# Patient Record
Sex: FEMALE | Race: WHITE | Employment: STUDENT | URBAN - METROPOLITAN AREA
[De-identification: names, ages, dates, MRNs, and addresses within clinical notes are randomized per-mention and may not be internally consistent; named-entity substitution may affect disease eponyms.]

---
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
## 2022-09-02 ENCOUNTER — OFFICE VISIT (OUTPATIENT)
Dept: FAMILY MEDICINE CLINIC | Facility: CLINIC | Age: 22
End: 2022-09-02

## 2022-09-02 ENCOUNTER — PATIENT ROUNDING (BHMG ONLY) (OUTPATIENT)
Dept: FAMILY MEDICINE CLINIC | Facility: CLINIC | Age: 22
End: 2022-09-02

## 2022-09-02 VITALS
BODY MASS INDEX: 37.39 KG/M2 | HEIGHT: 63 IN | HEART RATE: 100 BPM | OXYGEN SATURATION: 97 % | SYSTOLIC BLOOD PRESSURE: 122 MMHG | DIASTOLIC BLOOD PRESSURE: 82 MMHG | WEIGHT: 211 LBS | TEMPERATURE: 97.3 F

## 2022-09-02 DIAGNOSIS — F41.8 DEPRESSION WITH ANXIETY: Primary | ICD-10-CM

## 2022-09-02 PROBLEM — Z97.5 IUD (INTRAUTERINE DEVICE) IN PLACE: Status: ACTIVE | Noted: 2022-09-02

## 2022-09-02 PROBLEM — F33.9 EPISODE OF RECURRENT MAJOR DEPRESSIVE DISORDER: Status: ACTIVE | Noted: 2022-09-02

## 2022-09-02 PROCEDURE — 99204 OFFICE O/P NEW MOD 45 MIN: CPT | Performed by: PHYSICIAN ASSISTANT

## 2022-09-02 RX ORDER — OMEPRAZOLE 20 MG/1
20 CAPSULE, DELAYED RELEASE ORAL DAILY
COMMUNITY
Start: 2021-09-08 | End: 2022-09-09

## 2022-09-02 RX ORDER — VENLAFAXINE HYDROCHLORIDE 150 MG/1
150 CAPSULE, EXTENDED RELEASE ORAL DAILY
Qty: 30 CAPSULE | Refills: 2 | Status: SHIPPED | OUTPATIENT
Start: 2022-09-02 | End: 2022-10-03

## 2022-09-02 RX ORDER — VENLAFAXINE HYDROCHLORIDE 75 MG/1
75 CAPSULE, EXTENDED RELEASE ORAL DAILY
Qty: 30 CAPSULE | Refills: 2 | Status: SHIPPED | OUTPATIENT
Start: 2022-09-02 | End: 2022-11-07

## 2022-09-02 RX ORDER — BUPROPION HYDROCHLORIDE 150 MG/1
150 TABLET ORAL EVERY MORNING
Qty: 30 TABLET | Refills: 2 | Status: SHIPPED | OUTPATIENT
Start: 2022-09-02 | End: 2023-03-21

## 2022-09-02 RX ORDER — TRAZODONE HYDROCHLORIDE 50 MG/1
50 TABLET ORAL NIGHTLY
COMMUNITY
Start: 2022-08-30

## 2022-09-02 RX ORDER — VENLAFAXINE HYDROCHLORIDE 150 MG/1
150 CAPSULE, EXTENDED RELEASE ORAL DAILY
COMMUNITY
Start: 2022-08-30 | End: 2022-09-02

## 2022-09-02 RX ORDER — LORATADINE 10 MG/1
10 TABLET ORAL DAILY
COMMUNITY

## 2022-09-02 RX ORDER — BUPROPION HYDROCHLORIDE 150 MG/1
150 TABLET ORAL EVERY MORNING
COMMUNITY
Start: 2022-04-06 | End: 2022-09-02 | Stop reason: SDUPTHER

## 2022-09-02 RX ORDER — ALBUTEROL SULFATE 90 UG/1
2 AEROSOL, METERED RESPIRATORY (INHALATION)
COMMUNITY
Start: 2022-04-06

## 2022-09-02 RX ORDER — HYDROXYZINE HYDROCHLORIDE 25 MG/1
25 TABLET, FILM COATED ORAL 3 TIMES DAILY PRN
Qty: 60 TABLET | Refills: 2 | Status: SHIPPED | OUTPATIENT
Start: 2022-09-02 | End: 2022-10-03
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
## 2022-09-06 ENCOUNTER — REFERRAL TRIAGE (OUTPATIENT)
Dept: CASE MANAGEMENT | Facility: OTHER | Age: 22
End: 2022-09-06

## 2022-09-13 PROCEDURE — U0004 COV-19 TEST NON-CDC HGH THRU: HCPCS | Performed by: FAMILY MEDICINE

## 2022-09-14 ENCOUNTER — TELEPHONE (OUTPATIENT)
Dept: URGENT CARE | Facility: CLINIC | Age: 22
End: 2022-09-14

## 2022-09-14 NOTE — TELEPHONE ENCOUNTER
----- Message from Leighton Iglesias MD sent at 9/14/2022  1:40 PM EDT -----  Please inform patient of negative COVID test    ----- Message -----  From: Lab, Background User  Sent: 9/14/2022   1:32 PM EDT  To:  [address] Covid Results

## 2022-09-20 ENCOUNTER — PATIENT OUTREACH (OUTPATIENT)
Dept: CASE MANAGEMENT | Facility: OTHER | Age: 22
End: 2022-09-20

## 2022-09-20 NOTE — OUTREACH NOTE
SW made four attempts but was unable to establish contact with pt. SW will close referral at this time.    [facility] -   Ambulatory Case Management    9/20/2022, 10:33 EDT

## 2022-10-03 ENCOUNTER — OFFICE VISIT (OUTPATIENT)
Dept: FAMILY MEDICINE CLINIC | Facility: CLINIC | Age: 22
End: 2022-10-03

## 2022-10-03 VITALS
DIASTOLIC BLOOD PRESSURE: 80 MMHG | BODY MASS INDEX: 37.92 KG/M2 | HEIGHT: 63 IN | OXYGEN SATURATION: 98 % | SYSTOLIC BLOOD PRESSURE: 122 MMHG | TEMPERATURE: 97.1 F | WEIGHT: 214 LBS | HEART RATE: 95 BPM

## 2022-10-03 DIAGNOSIS — F33.9 EPISODE OF RECURRENT MAJOR DEPRESSIVE DISORDER, UNSPECIFIED DEPRESSION EPISODE SEVERITY: Primary | ICD-10-CM

## 2022-10-03 DIAGNOSIS — Z23 NEED FOR INFLUENZA VACCINATION: ICD-10-CM

## 2022-10-03 DIAGNOSIS — F41.9 ANXIETY: ICD-10-CM

## 2022-10-03 PROCEDURE — 90471 IMMUNIZATION ADMIN: CPT | Performed by: PHYSICIAN ASSISTANT

## 2022-10-03 PROCEDURE — 99214 OFFICE O/P EST MOD 30 MIN: CPT | Performed by: PHYSICIAN ASSISTANT

## 2022-10-03 PROCEDURE — 90686 IIV4 VACC NO PRSV 0.5 ML IM: CPT | Performed by: PHYSICIAN ASSISTANT

## 2022-10-03 RX ORDER — DESVENLAFAXINE SUCCINATE 50 MG/1
50 TABLET, EXTENDED RELEASE ORAL DAILY
Qty: 30 TABLET | Refills: 1 | Status: SHIPPED | OUTPATIENT
Start: 2022-10-03 | End: 2023-03-21

## 2022-10-03 RX ORDER — PROPRANOLOL HYDROCHLORIDE 10 MG/1
10 TABLET ORAL 2 TIMES DAILY PRN
Qty: 30 TABLET | Refills: 1 | Status: SHIPPED | OUTPATIENT
Start: 2022-10-03 | End: 2023-03-21

## 2022-10-03 RX ORDER — VENLAFAXINE HYDROCHLORIDE 37.5 MG/1
37.5 CAPSULE, EXTENDED RELEASE ORAL DAILY
Qty: 14 CAPSULE | Refills: 0 | Status: SHIPPED | OUTPATIENT
Start: 2022-10-03 | End: 2022-11-07

## 2022-10-03 NOTE — PATIENT INSTRUCTIONS
Decrease venlafaxine to 75 mg daily and start Pristiq 1 tablet daily for 2 weeks, THEN reduce venlafaxine to 37.5 mg daily for 2 weeks, THEN stop venlafaxine and continue Pristiq  Call [facility] to set up an appointment: [phone]
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
## 2022-11-07 ENCOUNTER — OFFICE VISIT (OUTPATIENT)
Dept: FAMILY MEDICINE CLINIC | Facility: CLINIC | Age: 22
End: 2022-11-07

## 2022-11-07 VITALS
DIASTOLIC BLOOD PRESSURE: 72 MMHG | HEART RATE: 94 BPM | SYSTOLIC BLOOD PRESSURE: 110 MMHG | WEIGHT: 218 LBS | OXYGEN SATURATION: 96 % | BODY MASS INDEX: 38.62 KG/M2 | HEIGHT: 63 IN

## 2022-11-07 DIAGNOSIS — F33.9 EPISODE OF RECURRENT MAJOR DEPRESSIVE DISORDER, UNSPECIFIED DEPRESSION EPISODE SEVERITY: Primary | ICD-10-CM

## 2022-11-07 PROCEDURE — 99213 OFFICE O/P EST LOW 20 MIN: CPT | Performed by: PHYSICIAN ASSISTANT

## 2022-11-07 NOTE — PROGRESS NOTES
"Chief Complaint   Patient presents with   • Depression     4 wk f/u       HPI     Oliva White is a 22 y.o. female who is here for 1 month  follow-up of depression.     Since her last visit, the patient states she established care with a psychiatrist at [facility]. She was seen by \"Nilda\" 3 days ago and started on a new medication but she is not sure what this is. She just found out today that her insurance will not be accepted in [place] and will not cover this medication or her other medications for about 1 week. She tapered off of venlafaxine and started Pristiq 2 weeks ago. She states she has a follow-up appointment with psychiatry next month. She feels her depression has worsened. She has ongoing stress related to living with an unsupportive roommate and the inability to get out of her lease. She admits to suicidal ideation but states she is not \"suicidal enough to go to the hospital.\" She does not have a current plan.       Past Medical History:   Diagnosis Date   • Allergic 2000   • Anemia 3/24/2016   • Anxiety 8/1/2011   • Asthma 2/17/2002       Past Surgical History:   Procedure Laterality Date   • ADENOIDECTOMY  2003   • TONSILLECTOMY  12/23/2020       Family History   Problem Relation Age of Onset   • Anxiety disorder Mother    • Depression Mother    • Hyperlipidemia Mother    • Mental illness Mother    • Hyperlipidemia Maternal Grandfather    • Alcohol abuse Paternal Grandfather    • Cancer Paternal Grandfather    • COPD Paternal Grandfather    • Alcohol abuse Paternal Grandmother    • Early death Maternal Uncle    • Mental illness Maternal Aunt        Social History     Socioeconomic History   • Marital status: Single   Tobacco Use   • Smoking status: Never   • Smokeless tobacco: Never   Vaping Use   • Vaping Use: Never used   Substance and Sexual Activity   • Alcohol use: Yes   • Drug use: Never   • Sexual activity: Not Currently     Partners: Male     Birth " control/protection: I.U.D.       Allergies   Allergen Reactions   • Clindamycin Other (See Comments) and Swelling     Facial/eye swelling with use of topical gel  Facial/eye swelling with use of topical gel  Eye swelling  Facial/eye swelling with use of topical gel         ROS    Review of Systems   Psychiatric/Behavioral: Positive for suicidal ideas, depressed mood and stress.       Vitals:    11/07/22 1252   BP: 110/72   Pulse: 94   SpO2: 96%     Body mass index is 38.62 kg/m².      Current Outpatient Medications:   •  albuterol sulfate  (90 Base) MCG/ACT inhaler, Inhale 2 puffs., Disp: , Rfl:   •  buPROPion XL (WELLBUTRIN XL) 150 MG 24 hr tablet, Take 1 tablet by mouth Every Morning., Disp: 30 tablet, Rfl: 2  •  desvenlafaxine (Pristiq) 50 MG 24 hr tablet, Take 1 tablet by mouth Daily., Disp: 30 tablet, Rfl: 1  •  Levonorgestrel (MIRENA) 20 MCG/DAY intrauterine device IUD, 1 each by Intrauterine route., Disp: , Rfl:   •  loratadine (CLARITIN) 10 MG tablet, Take 10 mg by mouth Daily., Disp: , Rfl:   •  ondansetron ODT (ZOFRAN-ODT) 4 MG disintegrating tablet, Place 1 tablet on the tongue Every 8 (Eight) Hours As Needed for Nausea or Vomiting for up to 6 doses., Disp: 6 tablet, Rfl: 0  •  propranolol (INDERAL) 10 MG tablet, Take 1 tablet by mouth 2 (Two) Times a Day As Needed (anxiety)., Disp: 30 tablet, Rfl: 1  •  traZODone (DESYREL) 50 MG tablet, Take 50 mg by mouth Every Night., Disp: , Rfl:     PE    Physical Exam  Vitals reviewed.   Constitutional:       General: She is not in acute distress.  Pulmonary:      Effort: Pulmonary effort is normal. No respiratory distress.   Neurological:      Mental Status: She is alert.   Psychiatric:         Mood and Affect: Affect is flat.         Behavior: Behavior is uncooperative and withdrawn.         Thought Content: Thought content does not include homicidal or suicidal plan.      Comments: Very aloof. The patient is playing a game on her phone during the visit.           Results    Results for orders placed or performed during the hospital encounter of 09/13/22   COVID-19 PCR, [facility], NP SWAB IN [facility]SP[facility] MEDIA/ORAL SWISH 24-30 HR TAT - Swab, Nasopharynx    Specimen: Nasopharynx; Swab   Result Value Ref Range    SARS-CoV-2 AMOL Not Detected Not Detected   POC Rapid Strep A    Specimen: Swab   Result Value Ref Range    Rapid Strep A Screen Positive (A) Negative, VALID, INVALID, Not Performed    Internal Control Passed Passed    Lot Number [id number]     Expiration Date 11-        A/P    Problem List Items Addressed This Visit        Mental Health    Episode of recurrent major depressive disorder (HCC) - Primary  -Recent transition to Pristiq without improvement thus far.   -The patient has not contacted her psychiatrist to let them know she was unable to  her new prescription so I did encourage her to do this and she agreed.  She verbalizes understanding that she should go to the emergency room if she has any SI with a plan.  -If she is unable to obtain prescription refills through her insurance, we did discuss good Rx and that her Pristiq and Wellbutrin should be reasonably priced with a goodRx coupon through [email or web address] pharmacy.  I asked her to let me know if she needs prescriptions called into a different location.  -Continue management per psychiatry and follow-up here in 6 weeks or sooner if needed.       Plan of care was reviewed with patient at the conclusion of today's visit. Education was provided regarding diagnoses, management, and the importance of keeping follow-up appointments. The patient was counseled regarding the risks, benefits, and possible side-effects of treatment. Patient and/or family express understanding and agreement with the management plan.        LISA Muñoz

## 2023-03-20 ENCOUNTER — E-VISIT (OUTPATIENT)
Dept: FAMILY MEDICINE CLINIC | Facility: TELEHEALTH | Age: 23
End: 2023-03-20
Payer: COMMERCIAL

## 2023-03-20 PROCEDURE — BRIGHTMDVISIT: Performed by: NURSE PRACTITIONER

## 2023-03-20 NOTE — E-VISIT TREATED
Chief Complaint: Cold, flu, COVID, sinus, hay fever, or seasonal allergies   Patient introduction   Patient is 23-year-old female with congestion, nasal discharge, voice hoarseness, headache, and fatigue that started less than 48 hours ago. Regarding date of symptom onset, patient writes: 03/18/2023.   COVID-19 exposure, testing history, and vaccination status:    No known exposure to a person with a confirmed or suspected case of COVID-19.    No recent travel outside of their local community.    Has not been tested for COVID-19 since symptom onset.    Patient was prompted to take a self-test at the time of interview, but does not have a COVID-19 test kit.    Received 3 doses of the COVID-19 vaccine (Moderna, Moderna, Moderna).    Received their most recent dose of the vaccine more than 14 days ago.   Risk factors for severe disease from COVID-19 infection:    BMI >= 25.    Asthma.    A mood disorder.   Patient-submitted comments: The headache I have feels like it's between my eyes and moving down towards my nose. I am an  and have a performance this week so getting my voice back and feeling better is very important.   Patient did not request an excuse note.   General presentation   Symptoms came on gradually.   Fever:    No fever.   Sinus and nasal symptoms:    Nasal discharge.    Postnasal drip.    Congestion without associated pain or pressure.    No itchy nose or sneezing.    No nasal drainage.   Throat symptoms:    Voice is moderately hoarse. Patient believes hoarseness is due to voice strain.    No sore throat.   Head and body aches:    Headache described as mild (1 to 3 on a scale of 1 to 10).    Fatigue.    No sweats.    No chills.    No myalgia.   Cough:    No cough.   Wheezing and shortness of breath:    Has asthma diagnosis.    Using an albuterol inhaler for asthma.    Using albuterol every 6 hours or longer.    No COPD diagnosis.    No wheezing.    No shortness of breath.    Current cold  symptoms do not aggravate their asthma.   Chest pain:    No chest pain.   Ear symptoms:    None.   Dizziness:    No dizziness.   Allergies:    Patient has known seasonal allergies, known pet allergies, and known dust allergies.    Patient does not think symptoms are allergy-related.   Flu exposure:    No recent known exposure to a person with a confirmed flu diagnosis.    Received a flu vaccine in the last 3 to 6 months.   Not taking any over-the-counter medications for current symptoms.   Review of red flags/alarm symptoms:    No changes in alertness or awareness.    No symptoms suggesting respiratory distress.    No symptoms suggesting intracranial hemorrhage.    No decreased urination.   Pregnancy/menstrual status/breastfeeding:    Not pregnant.    Not breastfeeding.    Regarding last menstrual period, patient writes: I have an IUD so I don't get my periods.   Self-exam:    Height: 160 centimeters    Weight: 99.7 kilograms    No difficulty moving their chin toward their chest.    Swollen/painful neck lymph nodes.   Recent antibiotic use:    Has not taken antibiotics for similar symptoms within the past month.   Current medications   Currently taking traZODone 50 MG tablet, Levonorgestrel 20 MCG/DAY intrauterine device IUD, loratadine 10 MG tablet, albuterol sulfate  (90 Base) MCG/ACT inhaler, Cymbalta, and Buspirone.   Medication allergies    Lincosamides.   Medication contraindication review   Patient has history of depression. Therefore, the following medication(s) will not be prescribed:    Metoclopramide.   No history of metoclopramide-associated dystonic reaction and tardive dyskinesia.   No known history of amoxicillin-clavulanate-associated cholestatic jaundice or hepatic impairment.   No known history of azithromycin-associated cholestatic jaundice or hepatic impairment.   Past medical history   Immune conditions: No immunocompromising conditions. No history of cancer.   Social history   Never  smoked tobacco.   Assessment   Allergic rhinitis. Ruled out: Traumatic laryngitis.   This is the likely diagnosis based on patient's interview responses and symptoms, including:    Congestion.    Nasal discharge.    Mild headache.    Postnasal drip.   Plan   Medications:    prednisone 20 mg tablet RX 20mg 1 tab PO qam 5d for allergies. Amount is 5 tab.    fluticasone 50 mcg/actuation nasal spray,suspension OTC 50mcg 2 sprays each nostril nasal qd 30d for nasal symptoms due to allergies or sinusitis. Fluticasone needs to be used every day to be effective. It may take up to a week for the full effects of the medication to be seen. Brands to look for include Flonase. Amount is 16 g.   The patient's prescription will be sent to:   [id number]   [address] [place] [address]   Phone: [phone]     Fax: [phone]   Patient informed to purchase OTC medication.   Education:    Condition and causes    Prevention    Treatment and self-care    When to call provider   ----------   Electronically signed by NIEVES Ortega on 2023-03-20 at 15:15PM   ----------   Patient Interview Transcript:   Please carefully consider each question and answer as best you can. This helps your provider give you the best care. Which of these symptoms are bothering you? Select all that apply.    Stuffed-up nose or sinuses    Runny nose    Hoarse voice or loss of voice    Headache    Fatigue or tiredness   Not selected:    Cough    Shortness of breath    Fever    Itchy or watery eyes    Itchy nose or sneezing    Loss of smell or taste    Sore throat    Sweats    Chills    Muscle or body aches    Nausea or vomiting    Diarrhea    I don't have any of these symptoms   Since your current symptoms started, have you had a viral test for COVID-19? - This includes home self-tests as well as nose swab or saliva tests done at a doctor's office, lab, or testing site. - It does NOT include antibody tests, which use a  "blood sample to test for past infection. Select one.    No   Not selected:    Yes   Taking a home COVID test can help your provider give you the best care. If you have a COVID test kit at home, take the test now before continuing with this interview. Do you have a COVID test kit at home? Select one.    No, I don't have a test kit   Not selected:    Yes, and I'll take a test now    Yes, but I prefer not to take a test now   Have you gotten the COVID-19 vaccine? Select one.    Yes   Not selected:    No   How many total doses of the COVID-19 vaccine have you gotten? This includes boosters as well as additional doses for those who are immunocompromised. Select one.    3 doses   Not selected:    1 dose    2 doses    4 doses    5 doses   1st dose    Moderna   Not selected:    J&J/Benito    Novavax    Pfizer   2nd dose    Moderna   Not selected:    J&J/Benito    Novavax    Pfizer   3rd dose    Moderna   Not selected:    J&J/Benito    Novavax    Pfizer   When did you get your most recent dose of the COVID-19 vaccine?    More than 14 days ago   Not selected:    Less than 48 hours (2 days) ago    48 to 72 hours (3 days) ago    3 to 5 days ago    5 to 7 days ago    7 to 14 days ago   In the last 14 days, have you traveled outside of your local community? This includes travel by car, RV, bus, train, or plane. Travel increases your chances of getting and spreading COVID-19. Select one.    No   Not selected:    Yes   In the last 14 days, have you had close contact with someone who has coronavirus (COVID-19)? \"Close contact\" means any of these: - Living in the same household as someone with COVID-19. - Caring for someone with COVID-19. - Being within 6 feet of someone with COVID-19 for a total of at least 15 minutes over a 24-hour period. For example, three 5-minute exposures for a total of 15 minutes. - Being in direct contact with respiratory droplets from someone with COVID-19 (being coughed on, kissing, sharing utensils). " "Select one.    No, not that I know of   Not selected:    Yes, a confirmed case    Yes, a suspected case   When did your current symptoms start? Select one.    Less than 48 hours ago   Not selected:    3 to 5 days ago    6 to 9 days ago    10 to 14 days ago    2 to 3 weeks ago    3 to 4 weeks ago    More than a month ago   Do you know the exact date your symptoms started? If so, enter the date as MM/DD/YY. Select one.    Yes (specify): 03/18/2023   Not selected:    No   Did your symptoms come on suddenly or gradually? Select one.    Gradually   Not selected:    Suddenly    I'm not sure   You mentioned having a headache. On a scale of 1 to 10, how severe is your headache pain? Select one.    Mild (1 to 3)   Not selected:    Moderate (4 to 6)    Severe (7 to 9)    Unbearable (10)    The worst headache of my life (10+)   You mentioned having a stuffy nose or sinus congestion. Do you feel pain or pressure in your sinuses?    No   Not selected:    Yes   What color is your nasal drainage? Select one.    My nose is stuffed but not draining or running   Not selected:    Clear    White    Yellow    Green    I'm not sure   Is there any drainage (mucus) going down the back of your throat? This kind of drainage is also called \"postnasal drip.\" Select one.    Yes   Not selected:    No, not that I know of   Since your symptoms started, have you felt dizzy? Select one.    No   Not selected:    Yes, but I can continue with my regular daily activities    Yes, and it makes it hard to stand, walk, or do daily activities   Do you have chest pain? You might also feel it as discomfort, aching, tightness, or squeezing in the chest. Select one.    No   Not selected:    Yes   Have you urinated at least 3 times in the last 24 hours? Select one.    Yes   Not selected:    No   Changes in alertness or awareness may mean you need emergency care. Since your symptoms started, have you had any of these? Select all that apply.    None of the above   " Not selected:    Confusion    Slurred speech    Not knowing where you are or what day it is    Difficulty staying conscious    Fainting or passing out   Do your symptoms include a whistling sound, or wheezing, when you breathe? Select one.    No   Not selected:    Yes    I'm not sure   Early in this interview, you told us you were hoarse or you'd lost your voice. How would you describe the changes to your voice? Select one.    It's harder than usual to talk   Not selected:    It just sounds a little raspy    I can barely talk at all   Is it possible that you strained your voice? Singing, yelling, or talking more or louder than usual can cause voice strain. Select one.    Yes   Not selected:    No, not that I know of   Do you have any of these symptoms in your ear(s)? Select all that apply.    None of the above   Not selected:    Pain    Pressure    Fullness    Crackling or popping    Plugged or blocked sensation   Can you move your chin toward your chest?    Yes   Not selected:    No, my neck is too stiff   Are your glands/lymph nodes swollen, or does it hurt when you touch them?    Yes   Not selected:    No, not that I can tell   In the past week, has anyone around you (such as at school, work, or home) had a confirmed diagnosis of the flu? A confirmed diagnosis means that a nose swab was done to verify a flu infection. Select all that apply.    No, not that I know of   Not selected:    I live with someone who has the flu    I've been within touching distance of someone who has the flu    I've walked by, or sat about 3 feet away from, someone who has the flu    I've been in the same building as someone who has the flu   Have you ever been diagnosed with asthma? Select one.    Yes   Not selected:    No   Are your cold symptoms making your asthma worse? Select one.    No   Not selected:    Yes   What medications or inhalers are you currently using for your asthma? Select all that apply.    Albuterol inhaler, as  needed (such as ProAir, Proventil, Ventolin)   Not selected:    Inhaled steroid (such as Qvar, Pulmicort, Flovent)    Inhaled steroid with long-acting bronchodilator (such as Advair, Dulera, Symbicort)    I'm not sure    I'm not taking any medications for my asthma    I usually take medications for my asthma, but I ran out   How often are you using albuterol? If you're using albuterol very frequently, you'll need to be seen in person. Select one.    Every 6 hours or longer   Not selected:    More than once an hour    Every 1 to 2 hours    Every 2 to 3 hours    Every 3 to 4 hours    Every 4 to 6 hours   Do you need a refill of albuterol? Select one.    No   Not selected:    Yes   Have you ever been diagnosed with chronic obstructive pulmonary disease (COPD)? Select one.    No, not that I know of   Not selected:    Yes   In the past month, have you taken antibiotics for similar symptoms? Examples of antibiotics include amoxicillin, amoxicillin-clavulanate (Augmentin), penicillin, cefdinir (Omnicef), doxycycline, and clindamycin (Cleocin). Select one.    No   Not selected:    Yes (specify)   Do you have allergies (pollen, dust mites, mold, animal dander)? Select one.    Yes   Not selected:    No, not that I know of   What kind of allergies do you have? Select all that apply.    Seasonal allergies (hay fever)    Pet allergies    Dust allergies   Not selected:    None of the above    I'm not sure   Do you think your symptoms could be allergy-related? Select one.    No   Not selected:    Yes    I'm not sure   Have you had a flu shot this season? Select one.    Yes, 3 to 6 months ago   Not selected:    Yes, less than 2 weeks ago    Yes, 2 to 4 weeks ago    Yes, 1 to 3 months ago    Yes, more than 6 months ago    No   Are you pregnant? Select one.    No   Not selected:    Yes   When was your last menstrual period? If you don't currently have periods or no longer have periods, please briefly explain.    I have an IUD so I  don't get my periods   Within the last 2 weeks, have you: - Given birth - Had a miscarriage - Had a pregnancy loss - Had an  Being postpartum (live birth or loss) within the last 2 weeks increases your risk of flu complications. Select one.    No   Not selected:    Yes   Are you breastfeeding? Select one.    No   Not selected:    Yes   The flu and COVID-19 can be more serious for people in certain groups. The next few questions help us figure out if you or anyone you live with is at higher risk for complications from these infections. Do any of these statements apply to you? Select all that apply.    None of the above   Not selected:    I'm     I'm     I'm Black    I'm  or    Do you smoke tobacco? Select one.    No   Not selected:    Yes, every day    Yes, some days    No, I quit   Do you have any of these conditions? Select all that apply.    Mood disorder, including depression or schizophrenia spectrum disorders   Not selected:    Chronic lung disease, such as cystic fibrosis or interstitial fibrosis    Heart disease, such as congenital heart disease, congestive heart failure, or coronary artery disease    Disorder of the brain, spinal cord, or nerves and muscles, such as dementia, cerebral palsy, epilepsy, muscular dystrophy, or developmental delay    Metabolic disorder or mitochondrial disease    Cerebrovascular disease, such as stroke or another condition affecting the blood vessels or blood supply to the brain    Down syndrome    Substance use disorder, such as alcohol, opioid, or cocaine use disorder    Tuberculosis    None of the above   Do you live in a group care setting? Examples include: - Nursing home - Residential care - Psychiatric treatment facility - Group home - [facility] - Board and care home - Homeless shelter - Foster care setting Select one.    No   Not selected:    Yes   Are you a healthcare worker? Select one.    No   Not selected:    Yes    People with a very high body mass index (BMI) are at higher risk for developing complications from the flu and severe illness from COVID-19. To determine your BMI, we need to know your weight and height. Please enter your weight (in pounds).    Weight   Please enter your height.    Height   Do you have any of these conditions that can affect the immune system? Scroll to see all options. Select all that apply.    None of these   Not selected:    History of bone marrow transplant    Chronic kidney disease    Chronic liver disease (including cirrhosis)    HIV/AIDS    Inflammatory bowel disease (Crohn's disease or ulcerative colitis)    Lupus    Moderate to severe plaque psoriasis    Multiple sclerosis    Rheumatoid arthritis    Sickle cell anemia    Alpha or beta thalassemia    History of solid organ transplant (kidney, liver, or heart)    History of spleen removal    An autoimmune disorder not listed here    A condition requiring treatment with long-term use of oral steroids (such as prednisone, prednisolone, or dexamethasone)   Have you ever been diagnosed with cancer? Select one.    No   Not selected:    Yes, I have cancer now    Yes, but I'm in remission   Do any of these apply to you? Select all that apply.    None of the above   Not selected:    I've been hospitalized within the last 5 days    I have diabetes    I'm in close contact with a child in    The flu and COVID-19 can be more serious for people in certain groups. Do any of these apply to the people who live with you? Select all that apply.    None of the above   Not selected:    Under age 5    Over age 65            Black     or     Pregnant    Has given birth, had a miscarriage, had a pregnancy loss, or had an  in the last 2 weeks   Does any member of your household have any of these medical conditions? Select all that apply.    None of the above   Not selected:    Asthma    Disorders of the brain,  spinal cord, or nerves and muscles, such as dementia, cerebral palsy, epilepsy, muscular dystrophy, or developmental delay    Chronic lung disease, such as COPD or cystic fibrosis    Heart disease, such as congenital heart disease, congestive heart failure, or coronary artery disease    Cerebrovascular disease, such as stroke or another condition affecting the blood vessels or blood supply to the brain    Blood disorders, such as sickle cell disease    Diabetes    Metabolic disorders such as inherited metabolic disorders or mitochondrial disease    Kidney disorders    Liver disorders    Weakened immune system due to illness or medications such as chemotherapy or steroids    Children under the age of 19 who are on long-term aspirin therapy    Extreme obesity (BMI > 40)   Do you have any of these conditions? Scroll to see all options. Select all that apply.    Depression   Not selected:    Aspirin triad (also known as Samter's triad or ASA triad)    Asthma or hives from taking aspirin or other NSAIDs, such as ibuprofen or naproxen    Blockage or narrowing of the blood vessels of the heart    Blood clotting disorder    Blood dyscrasia, such anemia, leukemia, lymphoma, or myeloma    Bone marrow depression    Catecholamine-releasing paraganglioma    Congenital long QT syndrome    Difficulty urinating or completely emptying your bladder    Uncorrected electrolyte abnormalities    Fungal infection    Gastrointestinal (GI) bleeding    Gastrointestinal (GI) obstruction    G6PD deficiency    Recent heart attack    High blood pressure    Irregular heartbeat or heart rhythm    Mononucleosis (mono)    Myasthenia gravis    Parkinson's disease    Pheochromocytoma    Reye syndrome    Seizure disorder    Thyroid disease    Ulcerative colitis    None of the above   Have you ever had either of these conditions? Select all that apply.    No   Not selected:    Metoclopramide-associated dystonic reaction    Tardive dyskinesia   Just a  "few more questions about medications, and then you're finished. Have you used any non-prescription medications or nasal sprays for your current symptoms? Examples include saline sprays, decongestants, NyQuil, and Tylenol. Select one.    No   Not selected:    Yes   Have you taken any monoamine oxidase inhibitor (MAOI) medications in the last 14 days? Examples include rasagiline (Azilect), selegiline (Eldepryl, Zelapar), isocarboxazid (Marplan), phenelzine (Nardil), and tranylcypromine (Parnate). Select one.    No, not that I know of   Not selected:    Yes   Do you take Kynmobi or Apokyn (apomorphine)? Select one.    No   Not selected:    Yes   Are you still taking these medications listed in your medical record? If you're not taking any of these, click Next. Select all that apply.    traZODone 50 MG tablet    Levonorgestrel 20 MCG/DAY intrauterine device IUD    loratadine 10 MG tablet    albuterol sulfate  (90 Base) MCG/ACT inhaler   Are you taking any other medications, vitamins, or supplements? Select one.    Yes   Not selected:    No   Have you ever had an allergic or bad reaction to any medication? Select one.    Yes   Not selected:    No   Have you had an allergic or bad reaction to any of these medications? Select all that apply.    No, not that I know of   Not selected:    Baloxavir (Xofluza)    Benzonatate (Tessalon Perles)    Fluconazole, itraconazole, or terconazole (brands include Diflucan, Sporanox, Terazol)    Oseltamivir (Tamiflu) or zanamivir (Relenza)    Paxlovid, nirmatrelvir, or ritonavir (Norvir)   Have you had an allergic or bad reaction to any of these antibiotic medications? Select all that apply.    Clindamycin or lincomycin (Brands include Cleocin and Lincocin)   Not selected:    Penicillin or any \"-cillin\" antibiotic, such as amoxicillin, ampicillin, dicloxacillin, nafcillin, or piperacillin (Brands include Augmentin, Unasyn, and Zosyn)    Tetracycline or any \"-cycline\" antibiotic, " "such as doxycycline, demeclocycline, minocycline (Brands include Declomycin, Doryx, Dynacin, Oracea, Monodox, Panmycin, and Vibramycin)    Ciprofloxacin or any \"-floxacin\" antibiotic, such as gemifloxacin, levofloxacin, moxifloxacin, or ofloxacin (Brands include Factive, Cipro, Floxin, and Levaquin)    Cephalexin or any \"cef-\" antibiotic, such as cefazolin, cefdinir, cefuroxime, ceftriaxone, ceftazidime, or cefepime (Brands include Ancef, Ceftin, Fortaz, Keflex, Maxipime, Rocephin, and Simplicef)    Azithromycin or any \"-thromycin\" antibiotic, such as erythromycin or clarithromycin (Brands include Biaxin, Erythrocin, Z-alexsandra, and Zithromax)    No, not that I know of   Have you had an allergic or bad reaction to any of these medications? Select all that apply.    No, not that I know of   Not selected:    Albuterol or a similar medication    Atropine    Corticosteroid (steroid) medication, including topical steroids, inhaled steroids, nasal steroids, or oral steroids (budesonide, ciclesonide, dexamethasone, flunisolide, fluticasone, methylprednisolone, triamcinolone, prednisone (or brand names Alvesco, Deltasone, Flovent, Medrol, Nasacort, Rhinocort, or Veramyst)    Metoclopramide (Reglan)    Ondansetron (Zuplenz, Zofran ODT, Zofran)    Prochlorperazine (Compazine)   Have you had an allergic or bad reaction to any of these eye drops, nasal sprays, or inhalers? Scroll to see all options. Select all that apply.    No, not that I know of   Not selected:    Azelastine (Astelin, Astepro, Optivar)    Cromolyn (Crolom, NasalCrom)    Ipratropium (Atrovent)    Ketotifen (Alaway, Zaditor)    Pheniramine/naphazoline (Naphcon-A, Opcon-A, Visine-A)    Olopatadine (Pataday, Patanol, Pazeo)   Have you had an allergic or bad reaction to any of these non-prescription medications? Scroll to see all options. Select all that apply.    No, not that I know of   Not selected:    Acetaminophen (Tylenol)    Aspirin    Cetirizine (Zyrtec)   "  Dextromethorphan (Delsym, Robitussin, Vicks DayQuil Cough)    Diphenhydramine (Benadryl)    Fexofenadine (Allegra)    Guaifenesin (Mucinex)    Dextromethorphan (Delsym)    Ibuprofen (Advil, Motrin, Midol)    Loratadine (Alavert, Claritin)    Oxymetazoline (Afrin)    Phenylephrine (Sudafed PE)    Pseudoephedrine (Sudafed)   Are you allergic to milk or to the proteins found in milk (for example, whey or casein)? A milk allergy is different from lactose intolerance. Select one.    No, not that I know of   Not selected:    Yes   Have you ever had jaundice or liver problems as a result of taking amoxicillin-clavulanate (Augmentin)? Jaundice is a condition in which the skin and the whites of the eyes turn yellow. Select all that apply.    No, not that I know of   Not selected:    Yes, jaundice    Yes, liver problems   Have you ever had jaundice or liver problems as a result of taking azithromycin (Zithromax, Zmax)? Jaundice is a condition in which the skin and the whites of the eyes turn yellow. Select all that apply.    No, not that I know of   Not selected:    Yes, jaundice    Yes, liver problems   Do you need a doctor's note? A doctor's note confirms that you received care today and states when you can return to school or work. It does not contain information about your diagnosis or treatment plan. Your provider will make the final decision on whether to give you a doctor's note and for how long. Doctor's notes CANNOT be backdated. We can't provide medical leave paperwork through this type of visit. If more paperwork is needed to request time off, contact your primary care provider. Select one.    No   Not selected:    Today only (1 day)    Today and tomorrow (2 days)    3 days    5 days    7 days    10 days    14 days   Is there anything else you'd like to tell us about your symptoms?    The headache I have feels like it's between my eyes and moving down towards my nose. I am an  and have a performance  this week so getting my voice back and feeling better is very important   ----------   Medical history   The following information was received from the EMR on March 20, 2023.   Allergies:    CLINDAMYCIN   - Allergy Type: Medication   - Reaction: Other (See Comments), Swelling   - Severity: None   - Clinical Status: Active   - Verification Status: Confirmed   Medications:    traZODone (DESYREL) tablet   - Route: Oral   - Start Date: September 02, 2022   - End Date: None   - Status: Active    desvenlafaxine (PRISTIQ) 24 hr tablet   - Route: Oral   - Start Date: October 03, 2022   - End Date: None   - Status: Active    LEVONORGESTREL 20 MCG/DAY IU IUD   - Route: Intrauterine   - Start Date: September 02, 2022   - End Date: None   - Status: Active    buPROPion (WELLBUTRIN XL) 24 hr tablet   - Route: Oral   - Start Date: September 02, 2022   - End Date: None   - Status: Active    ondansetron (ZOFRAN-ODT) disintegrating tablet   - Route: Translingual   - Start Date: September 13, 2022   - End Date: None   - Status: Active    loratadine (CLARITIN) tablet 10 mg   - Route: Oral   - Start Date: September 02, 2022   - End Date: None   - Status: Active    propranolol (INDERAL) tablet   - Route: Oral   - Start Date: October 03, 2022   - End Date: None   - Status: Active    albuterol (PROVENTIL HFA;VENTOLIN HFA;PROAIR HFA) inhaler   - Route: Inhalation   - Start Date: September 02, 2022   - End Date: None   - Status: Active   Problem list:    IUD (intrauterine device) in place   - Category: Problem List Item   - Health Status:   - Start Date: September 02, 2022   - End Date: None   - Status: Active    Episode of recurrent major depressive disorder (HCC)   - Category: Problem List Item   - Health Status:   - Start Date: September 02, 2022   - End Date: None   - Status: Active    Anxiety   - Category: Problem List Item   - Health Status:   - Start Date: September 02, 2022   - End Date: None   - Status: Active    Asthma    - Category: Problem List Item   - Health Status:   - Start Date: September 02, 2022   - End Date: None   - Status: Active

## 2023-03-20 NOTE — EXTERNAL PATIENT INSTRUCTIONS
Diagnosis   Allergic rhinitis (allergies)   My name is Rosy Corrales, and I'm a healthcare provider at [facility]. I reviewed your interview and I see that you have allergic rhinitis, also known as seasonal allergies or hay fever. Allergic rhinitis is not an infection. It isn't caused by a virus or bacteria. Although allergy symptoms can be unpleasant, your symptoms aren't part of a more serious condition.   In your interview, you mentioned that you don't think your symptoms are allergy-related. However, based on your responses, I believe this is the right diagnosis for you. If you still aren't feeling better after following this treatment plan, contact us to set up an appointment.   Based on what you told me in your interview, I haven't prescribed any antibiotics. Antibiotics won't help with allergies. Your symptoms suggest you have allergies, not an infection:    You haven't had symptoms long enough to suggest a bacterial infection.    You don't have a fever. Viral and bacterial infections, but not allergies, can lead to a fever.    You don't have sinus pain. Sinus infections can cause sinus pain or pressure.    You don't have a sore throat. Bacterial infections like strep throat cause a severe sore throat.   Medications   Your pharmacy   [facility] [id number] [address] [place] [id number] [phone]     Prescription   Prednisone (20mg): Take 1 tablet by mouth every morning for 5 days for allergies.   Non-prescription   Fluticasone (50mcg): Spray 2 times in each nostril daily for nasal symptoms due to allergies or sinusitis. Fluticasone needs to be used every day to be effective. It may take up to a week for the full effects of the medication to be seen. Brands to look for include Flonase.   About your diagnosis   Allergic rhinitis, also called allergies or hay fever, is very common. Symptoms can seem similar to a cold, but they're caused by an allergen, not a virus. These are the key  things to know about allergies:    The best way to treat allergies is to avoid the cause of your symptoms. Medications can also help your symptoms.    Symptoms range in severity. They may be only mildly annoying or they may seriously affect day-to-day life.    Symptoms can be seasonal, triggered by tree pollen, grasses, and weeds. Mold, dust, or pet dander can cause allergy symptoms in any season.   In addition to symptoms affecting the nose and eyes, allergies can cause fatigue and irritability.   What to expect   Despite the unpleasant symptoms, you should feel better soon. Follow the treatment plan provided here.   When to seek care   Call us at [phone]   with any sudden or unexpected symptoms.    Severe allergy symptoms.    Your normal allergy medications stop working or cause uncomfortable side effects.    Sinus pain that lasts for more than 10 days, without improvement.    Severe chest pain.   Other treatment   If possible, avoid the allergens that trigger your allergy symptoms. If you normally use medications, inhalers, or an asthma action plan, continue using them as prescribed.   Prevention   Avoid your known allergens, if possible. Allergy medications work best if you take them before your symptoms develop.   Avoid smoke and air pollution. Smoke can make infections worse.    Flu vaccine information   Who should get a flu vaccine?   Everyone 6 months of age and older should get a yearly flu vaccine.   When should I get vaccinated?   You should get a flu vaccine by the end of October. Once you're vaccinated, it takes about two weeks for antibodies to develop and protect you against the flu. That's why it's important to get vaccinated as soon as possible.   After October, is it too late to get vaccinated?   No. You should still get vaccinated. As long as the flu viruses are still in your community, flu vaccines will remain available, even into January of next year or later.   Why do I need a flu  vaccine EVERY year?   Flu viruses are constantly changing, so flu vaccines are usually updated from one season to the next. Your protection from the flu vaccine also lessens over time.   Is the flu vaccine safe?   Yes. Over the last 50 years, hundreds of millions of Americans have safely received the flu vaccines.   What are the side effects of flu vaccines?   You CANNOT get the flu from a flu vaccine. Common side effects of the flu shot include soreness, redness and/or swelling where the shot was given, low grade fever, and aches. Common side effects of the nasal spray flu vaccine for adults include runny nose, headaches, sore throat, and cough. For children, side effects include wheezing, vomiting, muscle aches, and fever.   Does the flu vaccine increase your risk of getting COVID-19?   No. There is no evidence that getting a flu vaccine increases your risk of getting COVID-19.   Is it safe to get the flu vaccine along with a COVID-19 vaccine?   Yes. It's safe to get the flu vaccine with a COVID-19 vaccine or booster.   Contact your healthcare provider TODAY for details on when and where to get your flu vaccine.    Coronavirus (COVID-19) information   Common symptoms of COVID-19 include fever, cough, shortness of breath, fatigue, muscle or body aches, headaches, new loss of sense of taste or smell, sore throat, stuffy or runny nose, nausea or vomiting, and diarrhea. Most people who get COVID-19 have mild symptoms and can rest at home until they get better. Elderly people and those with chronic medical problems may be at risk for more serious complications.   FAQs about the COVID-19 vaccine   There are four authorized COVID-19 vaccines: [employer] Benito Vaccine (J&J/Benito), Moderna, Novavax, and Pf[employer] (Pfizer). The J&J/Benito and Novavax vaccines are approved for use in people aged 18 and older. The Moderna and Pfizer vaccines are approved for those aged 6 months and older. All four are  available at no cost. Even if you don't have health insurance, you can still get the COVID-19 vaccine for free.   Which vaccine is the best? Which vaccine should I get?   All four vaccines are highly effective. Even if you get COVID-19 after being vaccinated, all of the vaccines help prevent severe disease, hospitalization, and complications.   Most people should get whichever vaccine is first available to them. However, women younger than 50 years old should consider the rare risk of blood clots with low platelets after vaccination with the J&J/[employer] vaccine. This risk hasn't been seen with the other three vaccines.   Are the vaccines safe?   Yes. Hundreds of millions of people in the [place] have already safely received COVID-19 vaccines under the most intense safety monitoring in the history of the [place].   Do I need the vaccine if I've already had COVID?   Yes. Vaccination helps protect you even if you've already had COVID.   If you had COVID-19 and had symptoms, wait to get vaccinated until you've recovered and completed your isolation period.   If you tested positive for COVID-19 but did not have symptoms, you can get vaccinated after 5 full days have passed since you had a positive test, as long as you don't develop symptoms.   How many doses of the vaccine do I need?   Visit www.cdc.gov/coronavirus/2019-ncov/vaccines/stay-up-to-date.html   to find out how to stay up to date with your COVID-19 vaccines.   I'm immunocompromised. How many doses of the vaccine do I need?   For information on how immunocompromised people can stay up to date with their COVID-19 vaccines, visit www.cdc.gov/coronavirus/2019-ncov/vaccines/recommendations/immuno.html  .   What are the common side effects of the vaccine?   A sore arm, tiredness, headache, and muscle pain may occur within two days of getting the vaccine and last a day or two. For the Moderna or Pfizer vaccines, side effects are more common after the second dose. People over  the age of 55 are less likely to have side effects than younger people.   After I'm up to date on vaccines, can I still get or spread COVID?   Yes, you can still get COVID, but your disease should be milder. And your risk of serious illness, hospitalization, and complications will be much lower, especially if you're up to date. Unfortunately, you can still spread COVID if you've been vaccinated. That's why it's important to follow isolation guidelines if you get sick or test positive.   After I'm up to date on vaccines, can I go back to normal?   You should still wear a mask indoors in public if:    It's required by laws, rules, regulations, or local guidance.    You have a weakened immune system.    Your age puts you at increased risk of severe disease.    You have a medical condition that puts you at increased risk of severe disease.    Someone in your household has a weakened immune system, is at increased risk for severe disease, or is unvaccinated.    You're in an area of high transmission.   Where can I get a COVID-19 vaccine?   Visit [facility]'s website for more information. To find a COVID-19 vaccination site near you, visit www.vaccines.gov/  , call [phone]  , or text your zip code to 229458 (CONSTRVCT). Message and data rates may apply.   What about travel?   Travel increases your risk of exposure to COVID-19. For more information, see www.cdc.gov/coronavirus/2019-ncov/travelers/index.html  .   I've had close contact with someone who has COVID. Do I need to quarantine, and if so, for how long?   For the most current answer, including a calculator to determine whether you need to stay home and for how long, visit www.cdc.gov/coronavirus/2019-ncov/your-health/quarantine-isolation.html  .   I've tested positive for COVID. How long do I need to isolate?   For the latest recommendations, including a calculator to determine how long you need to stay home, visit  www.cdc.gov/coronavirus/2019-ncov/your-health/quarantine-isolation.html  .   What if I develop symptoms that might be from COVID?   For the latest recommendations on what to do if you're sick, including when to seek emergency care, visit www.cdc.gov/coronavirus/2019-ncov/if-you-are-sick/steps-when-sick.html  .   Your provider   Your diagnosis was provided by NIEVES Ortega, a member of your trusted care team at [facility].   If you have any questions, call us at [phone]  .

## 2023-06-04 ENCOUNTER — E-VISIT (OUTPATIENT)
Dept: ADMINISTRATIVE | Facility: OTHER | Age: 23
End: 2023-06-04

## 2023-06-04 ENCOUNTER — E-VISIT (OUTPATIENT)
Dept: FAMILY MEDICINE CLINIC | Facility: TELEHEALTH | Age: 23
End: 2023-06-04

## 2023-06-04 NOTE — E-VISIT ESCALATED
Patient escalated   Provider NIEVES Sheriff chose to escalate patient to another level of care because: Desired treatments not available   Additional Details: Ms. Cindy, I recommend an in person visit with your PCP or [facility] if needing evaluation today. You need a good abdominal evaluation and your provider may want to order additional testing. Thank you, Radha PICKETT   Patient was sent the following message:   Based on the information you've provided us, you need to take another step to get care.   What to do now:   Urgent Care   You should be seen within the next 24 hours.   Please go to a walk-in clinic or urgent care, or make an appointment to be seen within the next 24 hours.   You won't be charged for your eVisit. If you paid with a credit card, the charge will be reversed.   Chief Complaint: Constipation   Patient introduction   Patient is 23-year-old female who has fewer than 3 stools per week, hard or lumpy stools, small stools, straining with bowel movements, a feeling of blockage in their rectum that prevents bowel movements, difficulty completely emptying their bowels, bloating, and abdominal distention. Their most bothersome symptom is having fewer than 3 stools per week. Symptoms have been present for more than 3 months.   Warning. The following may warrant further investigation:    Straining required for both hard and soft stools   General presentation   Sometimes needs help to have a bowel movement, such as using a finger to remove stool, but not with every bowel movement.   Abdomen does not feel white, tighter, or firmer as a result of bloating/distention.   Symptoms are persistent. Patient last passed stool yesterday. On average, patient has a bowel movement once a week. Patient feels the need or urge to have a bowel movement less than 3 times per week. When patient feels the need for a bowel movement, they always try to have one immediately. When patient tries to have a bowel movement,  they are always able to pass stool and they feel as if they have emptied their bowels less than half the time.   Current episode of constipation feels similar to past episodes.   Patient has used the following OTC or alternative treatment(s) for their symptoms: polyethylene glycol, magnesium citrate, bisacodyl, senna, docusate sodium, herbs or supplements, and homeopathic remedies. Has used bisacodyl more than 1 time, but not regularly. Takes an unknown dose of an unknown strength of senna more than 1 time, but not regularly.   Review of red flags/alarm symptoms:    No inflammatory bowel disease    No intestinal obstruction    No diverticulitis    No irritable bowel syndrome    No Hirschsprung's disease    No Chagas disease    No history of colorectal cancer    No unexplained or unintentional weight loss over the last 6 to 12 months    No family history of colorectal cancer in a first-degree relative    No family history of inflammatory bowel disease (IBD)    No systemic symptoms such as fever, nausea, vomiting, sweats or chills, or body aches    No rectal prolapse    No fecal incontinence   History of diagnosis/treatment for depression.   No history of iron-deficiency anemia.   Patient cites low water intake as lifestyle risk factors for constipation. No recent immobility or inactivity.   Regularly takes antidepressants. Did not use street/recreational opioids prior to symptom onset.   Psychosocial factors:   No significant life change, significant life stress, or increased stress in general.   Anxiety pre-screening with JAGUAR-2:    (0) Has not felt nervous, anxious, or on edge over the last 2 weeks.    (0) No inability to stop or control worrying over the last 2 weeks.    JAGUAR-2 score: 0. Negative result on JAGUAR-2 pre-screen, using a cutoff of 3.   Pregnancy/menstrual status/breastfeeding:   Not pregnant. Not breastfeeding. Regarding date of last menstrual period, patient writes: I have an IUD so I don't have periods.  .   Current medications   Currently taking DULoxetine 60 MG capsule, traZODone 50 MG tablet, Levonorgestrel 20 MCG/DAY intrauterine device IUD, busPIRone 10 MG tablet, DULoxetine 30 MG capsule, and buPROPion  MG 24 hr tablet.   Medication allergies   No relevant drug allergies.   Patient-submitted comments   Patient was asked if they had anything to add about their symptoms. Patient writes: I've had constipation my entire life. It has gotten worse the past year. In May it took15 doses of Miralax & Ex-lax over 48hrs for me to go. When I am able to go, it's extremely hard & causes bleeding (even hemorrhoids) or it's all at once & hurts. .   Patient did not request an excuse note.   Assessment:   Patient determined to need a level of care not appropriate to be delivered through eVisit.   Plan:   Patient informed of need to seek in-person care   ----------   Electronically signed by NIEVES Sheriff on 2023-06-04 at 10:40AM   ----------   Patient Interview Transcript:   Which symptoms are you having? Select all that apply.    Fewer than 3 stools per week    Hard or lumpy stools    Stools that are too small    Straining with bowel movements    Feeling as though a blockage in my rectum is preventing a bowel movement    Feeling as though I can't completely empty my bowels    Bloating    Swollen or distended abdomen   Not selected:    Abdominal pain or discomfort   Which symptom is bothering you the most? Select one.    Fewer than 3 stools per week   Not selected:    Abdominal pain or discomfort    Bloating    Swollen or distended abdomen    Hard or lumpy stools    Stools that are too small    Straining with bowel movements    Feeling as though a blockage in my rectum is preventing a bowel movement    Feeling as though I can't completely empty my bowels   How long have you had your symptoms? Select one.    More than 3 months   Not selected:    Less than 1 week    1 to 2 weeks    2 to 4 weeks    More than 1 month  but less than 3 months    I'm not sure   Are your symptoms constant, or do they come and go? For example, have you had normal stools in between the times you had hard stools? Are there times when you don't need to strain to have a bowel movement? Select one.    Constant   Not selected:    Come and go   Do you strain to pass both hard and soft stools? Select one.    Yes, both   Not selected:    No, just hard stools   When was the last time you passed any stool at all? Select one.    Yesterday   Not selected:    Today    2 days ago    3 days ago    More than 3 days ago   On average, how many times a week do you have a bowel movement? Select one.    Once a week   Not selected:    Twice a week    3 times per week    Every other day    Once a day    More than 1 time per day   How often do you feel the need or urge to have a bowel movement? Select one.    Less than 3 times per week   Not selected:    Every other day    Once a day    More than once a day   When you feel the need, how often do you immediately try to have a bowel movement? Select one.    Always   Not selected:    More than half the time    Less than half the time    Rarely or never   When you feel the need and then try to have a bowel movement, how often are you able to pass stool? Report passing any stool, even if it's a small amount. Select one.    Always   Not selected:    More than half the time    Less than half the time    Rarely or never   When you try to have a bowel movement, how often do you feel as if you've completely emptied your bowels? Select one.    Less than half the time   Not selected:    Always    More than half the time    Rarely or never   Do you ever need help having a bowel movement, such as using a finger to remove the stool from your rectum? Select one.    Yes   Not selected:    No   Do you need help with every bowel movement? Select one.    No   Not selected:    Yes   Do any of these describe your bloating or swollen abdomen?  Select all that apply.    My bloating and/or swollen abdomen symptoms have been getting worse   Not selected:    My belly feels white, tighter, or firmer than usual    I feel as if I need to pass gas, but I can't    None of these   Have you ever had similar symptoms in the past? Select one.    Yes   Not selected:    No   Do your current symptoms feel similar to past symptoms? Select one.    Yes   Not selected:    No   Since your symptoms began, have you passed or leaked stool when you didn't mean to? Select one.    No   Not selected:    Yes   Since your symptoms began, have you had any of these symptoms? Select all that apply.    None of these   Not selected:    Fever    Nausea    Vomiting    Sweats or chills    Body aches   Do you regularly take any of these medications? Some medications can cause or worsen symptoms of constipation. Select all that apply.    Antidepressants   Not selected:    Antacids that contain aluminum, such as Maalox, Mylanta, or Gaviscon    Iron supplements    Calcium supplements    Antihistamines, such as diphenhydramine (Benadryl) or loratadine (Claritin)    Antispasmodics, such as oxybutynin (Ditropan, Ditropan XL) or tolterodine (Detrol, Detrol LA)    Diuretics, such as chlorthalidone, chlorothiazide (Diuril), or hydrochlorothiazide    Calcium channel blockers, such as diltiazem (Cardizem) or verapamil    Antiemetics, such as ondansetron (Zofran)    Opioids, such as fentanyl, codeine, Percocet, oxycodone, OxyContin, methadone, morphine, or Vicodin    None of these   Street/recreational opioids such as heroin and illicitly manufactured fentanyl (IMF) can also cause or worsen symptoms of constipation. Did you use any street/recreational opioids before or around the time that your symptoms started? Your answers are confidential and will only be shared with your care team. Select one.    No   Not selected:    Yes   Have you been diagnosed with or are you being treated for any of these? Some  medical conditions can cause symptoms of constipation. Select all that apply.    Depression   Not selected:    Diabetes    Hypothyroidism    Hypokalemia    Stroke    Multiple sclerosis    Lupus    Parkinson's disease    Spinal cord injury    Anorexia nervosa    None of these   Do any of these apply to you? Certain lifestyle habits or changes can cause symptoms of constipation. Select all that apply.    I don't drink as much water as I should   Not selected:    I don't exercise as much or as often as I should    I don't eat as much fiber as I should (for example, beans, vegetables, fruits, nuts, and whole grains)    I drink more caffeine than I should    I drink more alcohol than I should    I recently changed my diet    None of these   Have you recently been immobile, inactive, or had to decrease your activity level? Decreased activity due to illness, injury, surgery, or a hospital stay can lead to symptoms of constipation. Select all that apply.    No   Not selected:    Yes, because of illness    Yes, because of injury    Yes, because of surgery    Yes, because of a hospital stay    Yes, because of another reason   Do any of these apply to you? Stress and mental health conditions can affect bowel habits. Select all that apply.    No   Not selected:    A big life change    A stressful event    Nothing has changed, but I feel more stress in general   Over the last 2 weeks, how often have you been bothered by feeling nervous, anxious, or on edge? Select one.    Not at all   Not selected:    Several days    More than half the days    Nearly every day   Over the last 2 weeks, how often have you been bothered by not being able to stop or control worrying? Select one.    Not at all   Not selected:    Several days    More than half the days    Nearly every day   Have you had or are you currently being treated for any of these conditions? Select all that apply.    None of these   Not selected:    Inflammatory bowel disease,  such as ulcerative colitis or Crohn's disease    Intestinal obstruction    Diverticulitis    Irritable bowel syndrome    Hirschsprung's disease    Chagas disease    Colon or rectal cancer   Have you ever been diagnosed with rectal prolapse? Select one.    No   Not selected:    Yes   Do any of these apply to you? Select all that apply.    None of these   Not selected:    Blood in the stool    Unexplained or unintentional weight loss over the last 6 to 12 months    Family history of colon or rectal cancer in your parents, siblings, or children    Family history of inflammatory bowel disease (IBD), such as ulcerative colitis or Crohn's disease in your parents, siblings, or children    Iron-deficiency anemia   Are you pregnant? Select one.    No   Not selected:    Yes   When was your last menstrual period? If you don't currently have periods or no longer have periods, please briefly explain.    I have an IUD so I don't have periods.   Are you breastfeeding? Select one.    No   Not selected:    Yes   Just a few more questions about medications, and then you're finished. Have you tried any of these non-prescription or alternative treatments for your symptoms? Select all that apply.    MiraLax (polyethylene glycol)    Magnesium citrate    Dulcolax, either by mouth or suppository (bisacodyl)    Ex-lax or Senokot (senna)    Colace or Dulcolax stool softener (docusate sodium)    Herbs or supplements    Homeopathic remedies   Not selected:    Metamucil (psyllium)    Citrucel (methylcellulose)    FiberCon (polycarbophil)    BeneFiber (wheat dextrin)    Milk of Magnesia (magnesium hydroxide)    Glycerin suppository    An enema    Essential oils    Acupuncture    None of the above   On average, how often are you using Dulcolax (bisacodyl)? Select one.    More than 1 time, but not regularly   Not selected:    Just once    Once a week    2 to 3 times per week    4 to 5 times per week    Every day   What dose of Dulcolax (bisacodyl)  do you usually use? Select one.    I'm not sure   Not selected:    10mg suppository    5mg by mouth    10mg by mouth    15mg by mouth    A dose not listed (specify)   On average, how often are you using Ex-lax or Senokot (senna)? Select one.    More than 1 time, but not regularly   Not selected:    Just once    Once a week    2 to 3 times per week    4 to 5 times per week    Every day   What strength of Ex-Lax or Senokot (senna) do you usually use? Select one.    I'm not sure   Not selected:    8.6mg tablet or piece    15mg tablet or piece    17.2mg tablet or piece    25mg tablet or piece    A strength not listed (specify)   What dose of Ex-lax or Senokot (senna) do you usually use? Select one.    I'm not sure   Not selected:    1 tablet or piece once a day    1 tablet or piece twice a day    2 tablets or pieces once a day    2 tablets or pieces twice a day    3 tablets or pieces once a day    3 tablets or pieces twice a day    4 tablets or pieces once a day    4 tablets or pieces twice a day    A dose not listed (specify)   Let's make sure the medications in your treatment plan are right for you. Are you being treated for or taking medications for any of these conditions? Select all that apply.    None of these   Not selected:    Abdominal pain without a clear cause    Difficulty swallowing    Esophageal stricture    Galactosemia    Known fecal impaction    Nausea/vomiting without a clear cause    Pulmonary edema    Severe dehydration    Gastroenteritis    Stomach or intestinal perforation    Toxic megacolon   Are you still taking these medications listed in your medical record? If you're not taking any of these, click Next. Select all that apply.    DULoxetine 60 MG capsule    traZODone 50 MG tablet    Levonorgestrel 20 MCG/DAY intrauterine device IUD    busPIRone 10 MG tablet    DULoxetine 30 MG capsule    buPROPion  MG 24 hr tablet   Are you taking any other medications, vitamins, or supplements? Select  one.    No   Not selected:    Yes   Have you ever had an allergic reaction to any medication? Select one.    Yes   Not selected:    No   Have you ever had an allergic reaction to lactulose? Brand names include Enulose, Kristalose, Constulose, and Generlac. Select one.    No, not that I know of   Not selected:    Yes   Have you ever had an allergic reaction to any of these non-prescription medications? Select all that apply.    No, not that I know of   Not selected:    Metamucil (psyllium)    Citrucel (methylcellulose)    FiberCon (polycarbophil)    BeneFiber (wheat dextrin)    MiraLax (polyethylene glycol)    Milk of Magnesia (magnesium hydroxide)    Glycerin suppository    Magnesium citrate    Dulcolax, either by mouth or suppository (bisacodyl)    Colace or Dulcolax stool softener (docusate sodium)    Ex-lax or Senokot (senna)   Do you need a doctor's note? A doctor's note confirms that you received care today and states when you can return to school or work. It does not contain information about your diagnosis or treatment plan. Your provider will make the final decision on whether to give you a doctor's note. Doctor's notes CANNOT be backdated. Select one.    No   Not selected:    Today only (1 day)    Today and tomorrow (2 days)    3 days   Is there anything you'd like to add about your symptoms? Please limit your comments to the symptoms asked about in this interview. If you include comments about other concerns, your provider may recommend that you be seen in person.    I've had constipation my entire life. It has gotten worse the past year. In May it took15 doses of Miralax & Ex-lax over 48hrs for me to go. When I am able to go, it's extremely hard & causes bleeding (even hemorrhoids) or it's all at once & hurts.   ----------   Medical history   Medical history data does not currently exist for this patient.

## 2023-06-04 NOTE — E-VISIT ESCALATED
Chief Complaint: Constipation   Patient was shown the following escalation message:   Some conditions need a visit with a healthcare provider   Because you have a family history of colorectal cancer or IBD, you may be at higher risk for similar conditions. You should speak with a provider to get care.   ----------   Patient Interview Transcript:   Which symptoms are you having? Select all that apply.    Fewer than 3 stools per week    Hard or lumpy stools    Stools that are too small    Straining with bowel movements    Feeling as though a blockage in my rectum is preventing a bowel movement    Feeling as though I can't completely empty my bowels    Bloating    Swollen or distended abdomen   Not selected:    Abdominal pain or discomfort   Which symptom is bothering you the most? Select one.    Fewer than 3 stools per week   Not selected:    Abdominal pain or discomfort    Bloating    Swollen or distended abdomen    Hard or lumpy stools    Stools that are too small    Straining with bowel movements    Feeling as though a blockage in my rectum is preventing a bowel movement    Feeling as though I can't completely empty my bowels   How long have you had your symptoms? Select one.    More than 3 months   Not selected:    Less than 1 week    1 to 2 weeks    2 to 4 weeks    More than 1 month but less than 3 months    I'm not sure   Are your symptoms constant, or do they come and go? For example, have you had normal stools in between the times you had hard stools? Are there times when you don't need to strain to have a bowel movement? Select one.    Constant   Not selected:    Come and go   Do you strain to pass both hard and soft stools? Select one.    Yes, both   Not selected:    No, just hard stools   When was the last time you passed any stool at all? Select one.    Yesterday   Not selected:    Today    2 days ago    3 days ago    More than 3 days ago   On average, how many times a week do you have a bowel movement?  Select one.    Once a week   Not selected:    Twice a week    3 times per week    Every other day    Once a day    More than 1 time per day   How often do you feel the need or urge to have a bowel movement? Select one.    Less than 3 times per week   Not selected:    Every other day    Once a day    More than once a day   When you feel the need, how often do you immediately try to have a bowel movement? Select one.    Always   Not selected:    More than half the time    Less than half the time    Rarely or never   When you feel the need and then try to have a bowel movement, how often are you able to pass stool? Report passing any stool, even if it's a small amount. Select one.    More than half the time   Not selected:    Always    Less than half the time    Rarely or never   When you try to have a bowel movement, how often do you feel as if you've completely emptied your bowels? Select one.    More than half the time   Not selected:    Always    Less than half the time    Rarely or never   Do you ever need help having a bowel movement, such as using a finger to remove the stool from your rectum? Select one.    Yes   Not selected:    No   Do you need help with every bowel movement? Select one.    No   Not selected:    Yes   Do any of these describe your bloating or swollen abdomen? Select all that apply.    My belly feels white, tighter, or firmer than usual   Not selected:    My bloating and/or swollen abdomen symptoms have been getting worse    I feel as if I need to pass gas, but I can't    None of these   Have you ever had similar symptoms in the past? Select one.    Yes   Not selected:    No   Do your current symptoms feel similar to past symptoms? Select one.    Yes   Not selected:    No   Since your symptoms began, have you passed or leaked stool when you didn't mean to? Select one.    No   Not selected:    Yes   Since your symptoms began, have you had any of these symptoms? Select all that apply.    None  of these   Not selected:    Fever    Nausea    Vomiting    Sweats or chills    Body aches   Do you regularly take any of these medications? Some medications can cause or worsen symptoms of constipation. Select all that apply.    Antidepressants   Not selected:    Antacids that contain aluminum, such as Maalox, Mylanta, or Gaviscon    Iron supplements    Calcium supplements    Antihistamines, such as diphenhydramine (Benadryl) or loratadine (Claritin)    Antispasmodics, such as oxybutynin (Ditropan, Ditropan XL) or tolterodine (Detrol, Detrol LA)    Diuretics, such as chlorthalidone, chlorothiazide (Diuril), or hydrochlorothiazide    Calcium channel blockers, such as diltiazem (Cardizem) or verapamil    Antiemetics, such as ondansetron (Zofran)    Opioids, such as fentanyl, codeine, Percocet, oxycodone, OxyContin, methadone, morphine, or Vicodin    None of these   Street/recreational opioids such as heroin and illicitly manufactured fentanyl (IMF) can also cause or worsen symptoms of constipation. Did you use any street/recreational opioids before or around the time that your symptoms started? Your answers are confidential and will only be shared with your care team. Select one.    No   Not selected:    Yes   Have you been diagnosed with or are you being treated for any of these? Some medical conditions can cause symptoms of constipation. Select all that apply.    Depression   Not selected:    Diabetes    Hypothyroidism    Hypokalemia    Stroke    Multiple sclerosis    Lupus    Parkinson's disease    Spinal cord injury    Anorexia nervosa    None of these   Do any of these apply to you? Certain lifestyle habits or changes can cause symptoms of constipation. Select all that apply.    I don't drink as much water as I should   Not selected:    I don't exercise as much or as often as I should    I don't eat as much fiber as I should (for example, beans, vegetables, fruits, nuts, and whole grains)    I drink more caffeine  than I should    I drink more alcohol than I should    I recently changed my diet    None of these   Have you recently been immobile, inactive, or had to decrease your activity level? Decreased activity due to illness, injury, surgery, or a hospital stay can lead to symptoms of constipation. Select all that apply.    No   Not selected:    Yes, because of illness    Yes, because of injury    Yes, because of surgery    Yes, because of a hospital stay    Yes, because of another reason   Do any of these apply to you? Stress and mental health conditions can affect bowel habits. Select all that apply.    No   Not selected:    A big life change    A stressful event    Nothing has changed, but I feel more stress in general   Over the last 2 weeks, how often have you been bothered by feeling nervous, anxious, or on edge? Select one.    Not at all   Not selected:    Several days    More than half the days    Nearly every day   Over the last 2 weeks, how often have you been bothered by not being able to stop or control worrying? Select one.    Not at all   Not selected:    Several days    More than half the days    Nearly every day   Have you had or are you currently being treated for any of these conditions? Select all that apply.    None of these   Not selected:    Inflammatory bowel disease, such as ulcerative colitis or Crohn's disease    Intestinal obstruction    Diverticulitis    Irritable bowel syndrome    Hirschsprung's disease    Chagas disease    Colon or rectal cancer   Have you ever been diagnosed with rectal prolapse? Select one.    No   Not selected:    Yes   Do any of these apply to you? Select all that apply.    Blood in the stool    Family history of inflammatory bowel disease (IBD), such as ulcerative colitis or Crohn's disease in your parents, siblings, or children   Not selected:    Unexplained or unintentional weight loss over the last 6 to 12 months    Family history of colon or rectal cancer in your  parents, siblings, or children    Iron-deficiency anemia    None of these   ----------   Medical history   Medical history data does not currently exist for this patient.

## 2023-10-02 ENCOUNTER — TELEPHONE (OUTPATIENT)
Dept: INTERNAL MEDICINE | Facility: CLINIC | Age: 23
End: 2023-10-02

## 2023-10-02 ENCOUNTER — OFFICE VISIT (OUTPATIENT)
Dept: INTERNAL MEDICINE | Facility: CLINIC | Age: 23
End: 2023-10-02
Payer: COMMERCIAL

## 2023-10-02 VITALS
SYSTOLIC BLOOD PRESSURE: 118 MMHG | WEIGHT: 231 LBS | TEMPERATURE: 98.7 F | HEART RATE: 111 BPM | OXYGEN SATURATION: 98 % | BODY MASS INDEX: 40.93 KG/M2 | DIASTOLIC BLOOD PRESSURE: 78 MMHG | HEIGHT: 63 IN

## 2023-10-02 DIAGNOSIS — Z13.29 SCREENING FOR ENDOCRINE, METABOLIC AND IMMUNITY DISORDER: ICD-10-CM

## 2023-10-02 DIAGNOSIS — N92.0 MENORRHAGIA WITH REGULAR CYCLE: ICD-10-CM

## 2023-10-02 DIAGNOSIS — F33.1 MODERATE EPISODE OF RECURRENT MAJOR DEPRESSIVE DISORDER: ICD-10-CM

## 2023-10-02 DIAGNOSIS — E66.01 CLASS 3 SEVERE OBESITY DUE TO EXCESS CALORIES WITHOUT SERIOUS COMORBIDITY WITH BODY MASS INDEX (BMI) OF 40.0 TO 44.9 IN ADULT: ICD-10-CM

## 2023-10-02 DIAGNOSIS — J45.20 MILD INTERMITTENT ASTHMA WITHOUT COMPLICATION: ICD-10-CM

## 2023-10-02 DIAGNOSIS — Z76.89 ENCOUNTER TO ESTABLISH CARE: Primary | ICD-10-CM

## 2023-10-02 DIAGNOSIS — Z23 NEED FOR INFLUENZA VACCINATION: ICD-10-CM

## 2023-10-02 DIAGNOSIS — R63.5 WEIGHT GAIN: ICD-10-CM

## 2023-10-02 DIAGNOSIS — F51.04 PSYCHOPHYSIOLOGICAL INSOMNIA: ICD-10-CM

## 2023-10-02 DIAGNOSIS — Z13.228 SCREENING FOR ENDOCRINE, METABOLIC AND IMMUNITY DISORDER: ICD-10-CM

## 2023-10-02 DIAGNOSIS — F41.9 ANXIETY: ICD-10-CM

## 2023-10-02 DIAGNOSIS — Z13.0 SCREENING FOR ENDOCRINE, METABOLIC AND IMMUNITY DISORDER: ICD-10-CM

## 2023-10-02 DIAGNOSIS — Z13.0 SCREENING FOR DISORDER OF BLOOD AND BLOOD-FORMING ORGANS: ICD-10-CM

## 2023-10-02 DIAGNOSIS — J30.9 ALLERGIC RHINITIS, UNSPECIFIED SEASONALITY, UNSPECIFIED TRIGGER: ICD-10-CM

## 2023-10-02 DIAGNOSIS — N92.6 IRREGULAR PERIODS: ICD-10-CM

## 2023-10-02 RX ORDER — DULOXETIN HYDROCHLORIDE 30 MG/1
30 CAPSULE, DELAYED RELEASE ORAL DAILY
Qty: 90 CAPSULE | Refills: 3 | Status: SHIPPED | OUTPATIENT
Start: 2023-10-02

## 2023-10-02 RX ORDER — TRAZODONE HYDROCHLORIDE 50 MG/1
50 TABLET ORAL NIGHTLY
Qty: 90 TABLET | Refills: 3 | Status: SHIPPED | OUTPATIENT
Start: 2023-10-02

## 2023-10-02 RX ORDER — BUPROPION HYDROCHLORIDE 300 MG/1
300 TABLET ORAL EVERY MORNING
Qty: 90 TABLET | Refills: 3 | Status: SHIPPED | OUTPATIENT
Start: 2023-10-02

## 2023-10-02 RX ORDER — DULOXETIN HYDROCHLORIDE 60 MG/1
60 CAPSULE, DELAYED RELEASE ORAL DAILY
Qty: 90 CAPSULE | Refills: 3 | Status: SHIPPED | OUTPATIENT
Start: 2023-10-02 | End: 2024-10-11

## 2023-10-02 RX ORDER — ALBUTEROL SULFATE 90 UG/1
2 AEROSOL, METERED RESPIRATORY (INHALATION) EVERY 4 HOURS PRN
Qty: 18 G | Refills: 3 | Status: SHIPPED | OUTPATIENT
Start: 2023-10-02

## 2023-10-02 NOTE — PROGRESS NOTES
Date: 10/02/2023    Name: Oliva White  : 2000    Chief Complaint:   Chief Complaint   Patient presents with    [facility] Care       HPI:  Oliva White is a 23 y.o. female presents to [facility] care in the clinic. Was following LISA Mu[place]oz, [place] [facility] [place]     Seeing psychiatrist at [facility] for medication.  Requesting all medications be refilled, so they can be filled on the same day.  Takes bupropion, duloxetine, trazodone, BuSpar as prescribed. Denies anhedonia, insomnia, hypersomnia, fatigue, feelings of worthlessness, difficulty concentrating, impaired memory, SI, HI, panic attackse.    Currently going to [employer] for grad school for voice performance.      Gained 50 pounds in the past year, since moving to [place].  Appetite waxes and wanes.  Admits some binge eating with depression.  Has always been overweight.  Has dieted and exercised with her mom to lose weight. Has taken hydroxycut.  Apple cider vinegar.  Intermittent fasting.      Chronic constipation.  Took 8 servings of miralax one day, 10 the following day with gatorade and an enema.  Digital extraction, caused hemorrhoids.  Has had a workup as a child.    FH of endometriosis, PCOS.  When she started having periods they were heavy and lasted for a week.  Once had a period that lasted 20 days.  Even while taking OCP continued to have heavy periods.  Significant menstrual cramping. Tired of using puppy pads and wearing 2 super size pads at night.  Tried using menstrual cup, caused pain.  She has noticed dyspareunia since becoming sexually active, as well. Currently Mirena implanted.        History:  LMP: cannot recall. Patient has had an implant.  Menarche: 13 years old  Sexual activity: rarely has penetrative sex.  Heterosexual, monogamous relationship x 1 year.    Last pap date: 21  Abnormal pap? no  : 0  Para: 0    Do you take any herbs or supplements that were not prescribed by a doctor? no      Health Habits:  Dental  Exam. up to date  Eye Exam. not up to date - no corrective lenses   Exercise: 5 times/week.  Current exercise activities include: walking  Current diet: eating healthier.  Well balanced.  Watching portion sizes.  Eats only when she's hungry.  Not buying junk food.      History:    Past Medical History:   Diagnosis Date    Allergic 2000    Anemia 3/24/2016    Anxiety 8/1/2011    Asthma 2/17/2002       Past Surgical History:   Procedure Laterality Date    ADENOIDECTOMY  2003    TONSILLECTOMY  12/23/2020       Family History   Problem Relation Age of Onset    Anxiety disorder Mother     Depression Mother     Hyperlipidemia Mother     Mental illness Mother     Hyperlipidemia Maternal Grandfather     Alcohol abuse Paternal Grandfather     Cancer Paternal Grandfather     COPD Paternal Grandfather     Alcohol abuse Paternal Grandmother     Early death Maternal Uncle     Mental illness Maternal Aunt        Social History     Socioeconomic History    Marital status: Single   Tobacco Use    Smoking status: Never    Smokeless tobacco: Never   Vaping Use    Vaping Use: Never used   Substance and Sexual Activity    Alcohol use: Not Currently    Drug use: Never    Sexual activity: Not Currently     Partners: Male     Birth control/protection: I.U.D.       Allergies   Allergen Reactions    Clindamycin Other (See Comments) and Swelling     Facial/eye swelling with use of topical gel  Facial/eye swelling with use of topical gel  Eye swelling  Facial/eye swelling with use of topical gel           Current Outpatient Medications:     albuterol sulfate  (90 Base) MCG/ACT inhaler, Inhale 2 puffs Every 4 (Four) Hours As Needed for Wheezing., Disp: 18 g, Rfl: 3    buPROPion XL (WELLBUTRIN XL) 300 MG 24 hr tablet, Take 1 tablet by mouth Every Morning., Disp: 90 tablet, Rfl: 3    DULoxetine (CYMBALTA) 30 MG capsule, Take 1 capsule by mouth Daily., Disp: 90 capsule, Rfl: 3    DULoxetine (CYMBALTA) 60 MG capsule, Take 1 capsule by  "mouth Daily., Disp: 90 capsule, Rfl: 3    traZODone (DESYREL) 50 MG tablet, Take 1 tablet by mouth Every Night., Disp: 90 tablet, Rfl: 3    brompheniramine-pseudoephedrine-DM 30-2-10 MG/5ML syrup, Take 5 mL by mouth 4 (Four) Times a Day As Needed for Allergies., Disp: 150 mL, Rfl: 0    busPIRone (BUSPAR) 10 MG tablet, , Disp: , Rfl:     Levonorgestrel (MIRENA) 20 MCG/DAY intrauterine device IUD, 1 each by Intrauterine route., Disp: , Rfl:     loratadine (CLARITIN) 10 MG tablet, Take 10 mg by mouth Daily., Disp: , Rfl:     ROS:  Review of Systems   All other systems reviewed and are negative.      VS:  Vitals:    10/02/23 1357   BP: 118/78   Pulse: 111   Temp: 98.7 øF (37.1 øC)   SpO2: 98%   Weight: 105 kg (231 lb)   Height: 160 cm (62.99\")     Body mass index is 40.93 kg/mý.    PE:  Physical Exam  Constitutional:       Appearance: She is obese. She is not ill-appearing.   HENT:      Head: Normocephalic.      Right Ear: External ear normal.      Left Ear: External ear normal.   Eyes:      Conjunctiva/sclera: Conjunctivae normal.      Pupils: Pupils are equal, round, and reactive to light.   Cardiovascular:      Rate and Rhythm: Normal rate and regular rhythm.      Pulses:           Radial pulses are 2+ on the right side and 2+ on the left side.        Dorsalis pedis pulses are 2+ on the right side and 2+ on the left side.      Heart sounds: Normal heart sounds.   Pulmonary:      Effort: Pulmonary effort is normal.      Breath sounds: Normal breath sounds.   Musculoskeletal:      Cervical back: Normal range of motion and neck supple.      Right lower leg: No edema.      Left lower leg: No edema.   Skin:     General: Skin is warm.      Capillary Refill: Capillary refill takes less than 2 seconds.   Neurological:      Mental Status: She is alert and oriented to person, place, and time.      Coordination: Coordination normal.      Gait: Gait normal.   Psychiatric:         Mood and Affect: Mood normal.         Behavior: " Behavior normal.         Thought Content: Thought content normal.         Assessment/Plan:         Diagnoses and all orders for this visit:    1. Encounter to establish care (Primary)    2. Allergic rhinitis, unspecified seasonality, unspecified trigger    3. Mild intermittent asthma without complication  -     albuterol sulfate  (90 Base) MCG/ACT inhaler; Inhale 2 puffs Every 4 (Four) Hours As Needed for Wheezing.  Dispense: 18 g; Refill: 3    4. Moderate episode of recurrent major depressive disorder  -     buPROPion XL (WELLBUTRIN XL) 300 MG 24 hr tablet; Take 1 tablet by mouth Every Morning.  Dispense: 90 tablet; Refill: 3  -     DULoxetine (CYMBALTA) 30 MG capsule; Take 1 capsule by mouth Daily.  Dispense: 90 capsule; Refill: 3  -     DULoxetine (CYMBALTA) 60 MG capsule; Take 1 capsule by mouth Daily.  Dispense: 90 capsule; Refill: 3  5. Anxiety  -     DULoxetine (CYMBALTA) 30 MG capsule; Take 1 capsule by mouth Daily.  Dispense: 90 capsule; Refill: 3  -     DULoxetine (CYMBALTA) 60 MG capsule; Take 1 capsule by mouth Daily.  Dispense: 90 capsule; Refill: 3        - Encouraged to take part in daily physical exercise.          - Eat healthy, well balanced diet; avoid sugary foods or beverages        - Abstain from alcohol and drugs         - Ensure good night's sleep by creating calm space in bedroom, avoiding screen time 1-2 hours before bed, no caffeine after 5 pm        - Talk to supportive family and friends, as needed        - advised pharmacy may not fill medication early    6. Weight gain  -     Hemoglobin A1c  -     T4, Free  -     Thyroid Peroxidase Antibody  -     TSH    7. Psychophysiological insomnia  -     traZODone (DESYREL) 50 MG tablet; Take 1 tablet by mouth Every Night.  Dispense: 90 tablet; Refill: 3    8. Screening for endocrine, metabolic and immunity disorder  -     Comprehensive Metabolic Panel    9. Screening for disorder of blood and blood-forming organs  -     CBC &  Differential  -     Vitamin B12    10. Class 3 severe obesity due to excess calories without serious comorbidity with body mass index (BMI) of 40.0 to 44.9 in adult  -     Vitamin D,25-Hydroxy    11. Irregular periods  -     DHEA-Sulfate  -     FSH & LH  -     Prolactin  -     Testosterone, Free, Total    12. Menorrhagia with regular cycle  -     DHEA-Sulfate  -     FSH & LH  -     Prolactin  -     Testosterone, Free, Total    13. Need for influenza vaccination  -     Fluzone >6 Months ([id number])          Return in about 6 months (around 4/2/2024) for Annual.

## 2023-10-14 LAB
25(OH)D3+25(OH)D2 SERPL-MCNC: 25.5 NG/ML (ref 30–100)
ALBUMIN SERPL-MCNC: 4.6 G/DL (ref 3.5–5.2)
ALBUMIN/GLOB SERPL: 2.7 G/DL
ALP SERPL-CCNC: 111 U/L (ref 39–117)
ALT SERPL-CCNC: 13 U/L (ref 1–33)
AST SERPL-CCNC: 12 U/L (ref 1–32)
BASOPHILS # BLD AUTO: 0.05 10*3/MM3 (ref 0–0.2)
BASOPHILS NFR BLD AUTO: 0.6 % (ref 0–1.5)
BILIRUB SERPL-MCNC: 0.4 MG/DL (ref 0–1.2)
BUN SERPL-MCNC: 12 MG/DL (ref 6–20)
BUN/CREAT SERPL: 14 (ref 7–25)
CALCIUM SERPL-MCNC: 9.7 MG/DL (ref 8.6–10.5)
CHLORIDE SERPL-SCNC: 102 MMOL/L (ref 98–107)
CO2 SERPL-SCNC: 29.4 MMOL/L (ref 22–29)
CREAT SERPL-MCNC: 0.86 MG/DL (ref 0.57–1)
DHEA-S SERPL-MCNC: 268 UG/DL (ref 110–431.7)
EGFRCR SERPLBLD CKD-EPI 2021: 97.5 ML/MIN/1.73
EOSINOPHIL # BLD AUTO: 0.22 10*3/MM3 (ref 0–0.4)
EOSINOPHIL NFR BLD AUTO: 2.8 % (ref 0.3–6.2)
ERYTHROCYTE [DISTWIDTH] IN BLOOD BY AUTOMATED COUNT: 11.6 % (ref 12.3–15.4)
FSH SERPL-ACNC: 4.9 MIU/ML
GLOBULIN SER CALC-MCNC: 1.7 GM/DL
GLUCOSE SERPL-MCNC: 87 MG/DL (ref 65–99)
HBA1C MFR BLD: 5.1 % (ref 4.8–5.6)
HCT VFR BLD AUTO: 43.3 % (ref 34–46.6)
HGB BLD-MCNC: 14.7 G/DL (ref 12–15.9)
IMM GRANULOCYTES # BLD AUTO: 0.08 10*3/MM3 (ref 0–0.05)
IMM GRANULOCYTES NFR BLD AUTO: 1 % (ref 0–0.5)
LH SERPL-ACNC: 6.2 MIU/ML
LYMPHOCYTES # BLD AUTO: 2.69 10*3/MM3 (ref 0.7–3.1)
LYMPHOCYTES NFR BLD AUTO: 34 % (ref 19.6–45.3)
MCH RBC QN AUTO: 30.7 PG (ref 26.6–33)
MCHC RBC AUTO-ENTMCNC: 33.9 G/DL (ref 31.5–35.7)
MCV RBC AUTO: 90.4 FL (ref 79–97)
MONOCYTES # BLD AUTO: 0.49 10*3/MM3 (ref 0.1–0.9)
MONOCYTES NFR BLD AUTO: 6.2 % (ref 5–12)
NEUTROPHILS # BLD AUTO: 4.38 10*3/MM3 (ref 1.7–7)
NEUTROPHILS NFR BLD AUTO: 55.4 % (ref 42.7–76)
NRBC BLD AUTO-RTO: 0 /100 WBC (ref 0–0.2)
PLATELET # BLD AUTO: 307 10*3/MM3 (ref 140–450)
POTASSIUM SERPL-SCNC: 4.6 MMOL/L (ref 3.5–5.2)
PROLACTIN SERPL-MCNC: 11.8 NG/ML (ref 4.8–23.3)
PROT SERPL-MCNC: 6.3 G/DL (ref 6–8.5)
RBC # BLD AUTO: 4.79 10*6/MM3 (ref 3.77–5.28)
SODIUM SERPL-SCNC: 140 MMOL/L (ref 136–145)
T4 FREE SERPL-MCNC: 1.13 NG/DL (ref 0.93–1.7)
TESTOST FREE SERPL-MCNC: NORMAL PG/ML
TESTOST SERPL-MCNC: 37 NG/DL (ref 13–71)
THYROPEROXIDASE AB SERPL-ACNC: 9 IU/ML (ref 0–34)
TSH SERPL DL<=0.005 MIU/L-ACNC: 2.19 UIU/ML (ref 0.27–4.2)
VIT B12 SERPL-MCNC: 206 PG/ML (ref 211–946)
WBC # BLD AUTO: 7.91 10*3/MM3 (ref 3.4–10.8)

## 2023-10-16 NOTE — PROGRESS NOTES
Vitamin B12 is a bit low.  Take an over the counter vitamin B12 supplement daily.    Vitamin D is also deficient.  Take an OTC vitamin D3 supplement, 2000 IU daily.  Sit with face and forearms exposed to the sun for 15 minutes a day to enhance absorption through the skin.   Awaiting Free Testosterone result; will notify her when result available.  Otherwise, labs are normal.  There are a couple of values just outside lab parameters, nothing concerning at this time.

## 2023-10-19 LAB
25(OH)D3+25(OH)D2 SERPL-MCNC: 25.5 NG/ML (ref 30–100)
ALBUMIN SERPL-MCNC: 4.6 G/DL (ref 3.5–5.2)
ALBUMIN/GLOB SERPL: 2.7 G/DL
ALP SERPL-CCNC: 111 U/L (ref 39–117)
ALT SERPL-CCNC: 13 U/L (ref 1–33)
AST SERPL-CCNC: 12 U/L (ref 1–32)
BASOPHILS # BLD AUTO: 0.05 10*3/MM3 (ref 0–0.2)
BASOPHILS NFR BLD AUTO: 0.6 % (ref 0–1.5)
BILIRUB SERPL-MCNC: 0.4 MG/DL (ref 0–1.2)
BUN SERPL-MCNC: 12 MG/DL (ref 6–20)
BUN/CREAT SERPL: 14 (ref 7–25)
CALCIUM SERPL-MCNC: 9.7 MG/DL (ref 8.6–10.5)
CHLORIDE SERPL-SCNC: 102 MMOL/L (ref 98–107)
CO2 SERPL-SCNC: 29.4 MMOL/L (ref 22–29)
CREAT SERPL-MCNC: 0.86 MG/DL (ref 0.57–1)
DHEA-S SERPL-MCNC: 268 UG/DL (ref 110–431.7)
EGFRCR SERPLBLD CKD-EPI 2021: 97.5 ML/MIN/1.73
EOSINOPHIL # BLD AUTO: 0.22 10*3/MM3 (ref 0–0.4)
EOSINOPHIL NFR BLD AUTO: 2.8 % (ref 0.3–6.2)
ERYTHROCYTE [DISTWIDTH] IN BLOOD BY AUTOMATED COUNT: 11.6 % (ref 12.3–15.4)
FSH SERPL-ACNC: 4.9 MIU/ML
GLOBULIN SER CALC-MCNC: 1.7 GM/DL
GLUCOSE SERPL-MCNC: 87 MG/DL (ref 65–99)
HBA1C MFR BLD: 5.1 % (ref 4.8–5.6)
HCT VFR BLD AUTO: 43.3 % (ref 34–46.6)
HGB BLD-MCNC: 14.7 G/DL (ref 12–15.9)
IMM GRANULOCYTES # BLD AUTO: 0.08 10*3/MM3 (ref 0–0.05)
IMM GRANULOCYTES NFR BLD AUTO: 1 % (ref 0–0.5)
LH SERPL-ACNC: 6.2 MIU/ML
LYMPHOCYTES # BLD AUTO: 2.69 10*3/MM3 (ref 0.7–3.1)
LYMPHOCYTES NFR BLD AUTO: 34 % (ref 19.6–45.3)
MCH RBC QN AUTO: 30.7 PG (ref 26.6–33)
MCHC RBC AUTO-ENTMCNC: 33.9 G/DL (ref 31.5–35.7)
MCV RBC AUTO: 90.4 FL (ref 79–97)
MONOCYTES # BLD AUTO: 0.49 10*3/MM3 (ref 0.1–0.9)
MONOCYTES NFR BLD AUTO: 6.2 % (ref 5–12)
NEUTROPHILS # BLD AUTO: 4.38 10*3/MM3 (ref 1.7–7)
NEUTROPHILS NFR BLD AUTO: 55.4 % (ref 42.7–76)
NRBC BLD AUTO-RTO: 0 /100 WBC (ref 0–0.2)
PLATELET # BLD AUTO: 307 10*3/MM3 (ref 140–450)
POTASSIUM SERPL-SCNC: 4.6 MMOL/L (ref 3.5–5.2)
PROLACTIN SERPL-MCNC: 11.8 NG/ML (ref 4.8–23.3)
PROT SERPL-MCNC: 6.3 G/DL (ref 6–8.5)
RBC # BLD AUTO: 4.79 10*6/MM3 (ref 3.77–5.28)
SODIUM SERPL-SCNC: 140 MMOL/L (ref 136–145)
T4 FREE SERPL-MCNC: 1.13 NG/DL (ref 0.93–1.7)
TESTOST FREE SERPL-MCNC: 4.6 PG/ML (ref 0–4.2)
TESTOST SERPL-MCNC: 37 NG/DL (ref 13–71)
THYROPEROXIDASE AB SERPL-ACNC: 9 IU/ML (ref 0–34)
TSH SERPL DL<=0.005 MIU/L-ACNC: 2.19 UIU/ML (ref 0.27–4.2)
VIT B12 SERPL-MCNC: 206 PG/ML (ref 211–946)
WBC # BLD AUTO: 7.91 10*3/MM3 (ref 3.4–10.8)

## 2023-11-02 ENCOUNTER — PATIENT MESSAGE (OUTPATIENT)
Dept: INTERNAL MEDICINE | Facility: CLINIC | Age: 23
End: 2023-11-02
Payer: COMMERCIAL

## 2023-11-02 DIAGNOSIS — E66.01 CLASS 3 SEVERE OBESITY DUE TO EXCESS CALORIES WITHOUT SERIOUS COMORBIDITY WITH BODY MASS INDEX (BMI) OF 40.0 TO 44.9 IN ADULT: Primary | ICD-10-CM

## 2023-11-13 NOTE — TELEPHONE ENCOUNTER
From: Oliva White  To: Rachelle Motley  Sent: 11/2/2023 10:25 PM EDT  Subject: Weight meds follow up    Hi Dr. Motley,    I just wanted to follow up on our chat about me starting some meds to help me with weight loss. I would really like to get on something sooner rather than later. Thanks in advance.

## 2023-11-14 RX ORDER — NALTREXONE HYDROCHLORIDE 50 MG/1
TABLET, FILM COATED ORAL
Qty: 25 TABLET | Refills: 0 | Status: SHIPPED | OUTPATIENT
Start: 2023-11-14

## 2023-12-27 ENCOUNTER — TELEMEDICINE (OUTPATIENT)
Dept: INTERNAL MEDICINE | Facility: CLINIC | Age: 23
End: 2023-12-27
Payer: MEDICAID

## 2023-12-27 DIAGNOSIS — E66.01 MORBID (SEVERE) OBESITY DUE TO EXCESS CALORIES: Primary | ICD-10-CM

## 2023-12-27 DIAGNOSIS — F41.9 ANXIETY: ICD-10-CM

## 2023-12-27 DIAGNOSIS — M54.50 LUMBAR BACK PAIN: ICD-10-CM

## 2023-12-27 DIAGNOSIS — F33.1 MODERATE EPISODE OF RECURRENT MAJOR DEPRESSIVE DISORDER: ICD-10-CM

## 2023-12-27 PROCEDURE — 1160F RVW MEDS BY RX/DR IN RCRD: CPT | Performed by: NURSE PRACTITIONER

## 2023-12-27 PROCEDURE — 99214 OFFICE O/P EST MOD 30 MIN: CPT | Performed by: NURSE PRACTITIONER

## 2023-12-27 PROCEDURE — 1159F MED LIST DOCD IN RCRD: CPT | Performed by: NURSE PRACTITIONER

## 2023-12-27 RX ORDER — PHENTERMINE HYDROCHLORIDE 37.5 MG/1
37.5 CAPSULE ORAL EVERY MORNING
Qty: 30 CAPSULE | Refills: 0 | Status: SHIPPED | OUTPATIENT
Start: 2023-12-27

## 2023-12-27 RX ORDER — DULOXETIN HYDROCHLORIDE 60 MG/1
60 CAPSULE, DELAYED RELEASE ORAL 2 TIMES DAILY
Qty: 180 CAPSULE | Refills: 3 | Status: SHIPPED | OUTPATIENT
Start: 2023-12-27 | End: 2025-01-05

## 2023-12-27 NOTE — PROGRESS NOTES
You have chosen to receive care through a telehealth visit.  Do you consent to use a video/audio connection for your medical care today? Yes  Active Parties: Rachelle PICKETT (work), Delvin Thomas and patient (car)

## 2023-12-27 NOTE — PROGRESS NOTES
Mode of Visit: Video  Location of patient: home  You have chosen to receive care through a telehealth visit.  Does the patient consent to use a video/audio connection for your medical care today? Yes  The visit included audio and video interaction. No technical issues occurred during this visit.    Date: 2023  Name: Oliva White  : 2000         Chief Complaint:   Chief Complaint   Patient presents with    Med Refill     F/u on meds         HPI:  Oliva White is a 23 y.o. female presents for video visit in regard to naltrexone, prescribed to help her lose weight.  Stopped taking it after a week. Became emotionally unstable after she started taking it, crying frequently.  Since stopping it, she has been eating a lot more.  Feeling more depressed and anxious than normal.  Takes trazodone at bedtime for insomnia, works well.  Continues to take bupropion  mg daily, BuSpar 10 mg 3 times daily as needed for anxiety.  Denies anhedonia, insomnia, hypersomnia, fatigue, difficulty concentrating, impaired memory, SI, HI, panic attacks, hot flashes.  Pinched a nerve in her back on [place].  Pain is gradually improving.  Doing stretches occasionally.    History: The following portions of the patient's history were reviewed and updated as appropriate: allergies, current medications, past medical history, family history, surgical history, social history and problem list.          PE:  Physical Exam   Constitutional: She appears well-developed and well-nourished. No distress. She appears morbidly obese.  HENT:   Head: Normocephalic.   Right Ear: External ear normal.   Left Ear: External ear normal.   Eyes: Pupils are equal, round, and reactive to light. Conjunctivae are normal.   Neck: Neck normal appearance.  Pulmonary/Chest: Effort normal.   Musculoskeletal:      Lumbar back: She exhibits decreased range of motion.   Neurological: She is alert.   Oriented x 3   Skin: No pallor.   Psychiatric: She  has a normal mood and affect. Her speech is normal and behavior is normal. Thought content normal. Her affect is normal.          Assessment/Plan:  Diagnoses and all orders for this visit:    1. Morbid (severe) obesity due to excess calories (Primary)  -     phentermine 37.5 MG capsule; Take 1 capsule by mouth Every Morning.  Dispense: 30 capsule; Refill: 0.  Advised of potential adverse effects.  - To schedule an in person visit in a month,      2. Moderate episode of recurrent major depressive disorder  -     DULoxetine (CYMBALTA) 60 MG capsule; Take 1 capsule by mouth 2 (Two) Times a Day.  Dispense: 180 capsule; Refill: 3  3. Anxiety  -     DULoxetine (CYMBALTA) 60 MG capsule; Take 1 capsule by mouth 2 (Two) Times a Day.  Dispense: 180 capsule; Refill: 3        - Encouraged to take part in daily physical exercise.          - Eat healthy, well balanced diet; avoid sugary foods or beverages        - Continue to abstain from alcohol and drugs        - Ensure good night's sleep by creating calm space in bedroom, avoiding screen time 1-2 hours before bed, no caffeine after 5 pm        - Talk to supportive family and friends, as needed        - Continue bupropion and BuSpar as prescribed    4. Lumbar back pain        - Continue gentle stretches a.m. and p.m.        - Acetaminophen or ibuprofen, per package directions, as needed for mild pain        Return in about 4 weeks (around 1/24/2024) for Annual.  Patient was given instructions and counseling regarding her condition or for health maintenance advice. Please see specific information pulled into the AVS if appropriate.

## 2024-02-27 ENCOUNTER — OFFICE VISIT (OUTPATIENT)
Dept: INTERNAL MEDICINE | Facility: CLINIC | Age: 24
End: 2024-02-27
Payer: COMMERCIAL

## 2024-02-27 VITALS
BODY MASS INDEX: 39.69 KG/M2 | TEMPERATURE: 98.7 F | WEIGHT: 224 LBS | HEIGHT: 63 IN | SYSTOLIC BLOOD PRESSURE: 118 MMHG | DIASTOLIC BLOOD PRESSURE: 70 MMHG | OXYGEN SATURATION: 95 % | HEART RATE: 99 BPM

## 2024-02-27 DIAGNOSIS — F41.9 ANXIETY: Primary | ICD-10-CM

## 2024-02-27 DIAGNOSIS — Z51.81 ENCOUNTER FOR THERAPEUTIC DRUG MONITORING: ICD-10-CM

## 2024-02-27 DIAGNOSIS — E66.01 MORBID (SEVERE) OBESITY DUE TO EXCESS CALORIES: ICD-10-CM

## 2024-02-27 DIAGNOSIS — F51.04 PSYCHOPHYSIOLOGICAL INSOMNIA: ICD-10-CM

## 2024-02-27 DIAGNOSIS — F33.1 MODERATE EPISODE OF RECURRENT MAJOR DEPRESSIVE DISORDER: ICD-10-CM

## 2024-02-27 RX ORDER — PHENTERMINE HYDROCHLORIDE 37.5 MG/1
37.5 CAPSULE ORAL EVERY MORNING
Qty: 30 CAPSULE | Refills: 1 | Status: SHIPPED | OUTPATIENT
Start: 2024-02-27

## 2024-02-27 NOTE — PROGRESS NOTES
Office Visit      Patient Name: Oliva White  : 2000   MRN: [id number]     Chief Complaint:    Chief Complaint   Patient presents with    Depression    Anxiety    Insomnia       History of Present Illness: Oliva White is a 24 y.o. female who is here today for follow up of medications prescribed for weight loss, depression, anxiety, insomnia.      Obesity: She has been taking phentermine 37.5 mg.  She has lost 7 pounds since starting it in .  No side effects noted.      Anxiety, depression, insomnia: taking bupropion, buspar, duloxetine, trazodone as prescribed.  Feels medications are working well.  Made changes at the beginning of the year, particularly at school.  Switched  and counselor.  Better relationship with current instructor and counselor.  Denies depressed mood, anhedonia, feelings of worthlessness, difficulty concentrating, impaired memory, SI, HI, panic attacks.    Subjective      I have reviewed and the following portions of the patient's history were updated as appropriate: past family history, past medical history, past social history, past surgical history and problem list.      Current Outpatient Medications:     albuterol sulfate  (90 Base) MCG/ACT inhaler, Inhale 2 puffs Every 4 (Four) Hours As Needed for Wheezing., Disp: 18 g, Rfl: 3    buPROPion XL (WELLBUTRIN XL) 300 MG 24 hr tablet, Take 1 tablet by mouth Every Morning., Disp: 90 tablet, Rfl: 3    busPIRone (BUSPAR) 10 MG tablet, , Disp: , Rfl:     DULoxetine (CYMBALTA) 60 MG capsule, Take 1 capsule by mouth 2 (Two) Times a Day., Disp: 180 capsule, Rfl: 3    Levonorgestrel (MIRENA) 20 MCG/DAY intrauterine device IUD, 1 each by Intrauterine route., Disp: , Rfl:     loratadine (CLARITIN) 10 MG tablet, Take 1 tablet by mouth Daily., Disp: , Rfl:     phentermine 37.5 MG capsule, Take 1 capsule by mouth Every Morning., Disp: 30 capsule, Rfl: 1    traZODone (DESYREL) 50 MG tablet, Take 1 tablet by  "mouth Every Night., Disp: 90 tablet, Rfl: 3    Allergies   Allergen Reactions    Clindamycin Other (See Comments) and Swelling     Facial/eye swelling with use of topical gel  Facial/eye swelling with use of topical gel  Eye swelling  Facial/eye swelling with use of topical gel         Objective     Physical Exam:  Vital Signs:   Vitals:    02/27/24 0927   BP: 118/70   Pulse: 99   Temp: 98.7 °F (37.1 °C)   SpO2: 95%   Weight: 102 kg (224 lb)   Height: 160 cm (62.99\")     Body mass index is 39.69 kg/m².         Physical Exam  Constitutional:       Appearance: She is obese. She is not ill-appearing.   HENT:      Head: Normocephalic.      Right Ear: External ear normal.      Left Ear: External ear normal.   Eyes:      Conjunctiva/sclera: Conjunctivae normal.      Pupils: Pupils are equal, round, and reactive to light.   Cardiovascular:      Rate and Rhythm: Normal rate and regular rhythm.      Pulses:           Radial pulses are 2+ on the right side and 2+ on the left side.        Dorsalis pedis pulses are 2+ on the right side and 2+ on the left side.      Heart sounds: Normal heart sounds.   Pulmonary:      Effort: Pulmonary effort is normal.      Breath sounds: Normal breath sounds.   Musculoskeletal:      Cervical back: Normal range of motion and neck supple.      Right lower leg: No edema.      Left lower leg: No edema.   Skin:     General: Skin is warm.      Capillary Refill: Capillary refill takes less than 2 seconds.   Neurological:      Mental Status: She is alert and oriented to person, place, and time.      Coordination: Coordination normal.      Gait: Gait normal.   Psychiatric:         Mood and Affect: Mood normal.         Behavior: Behavior normal.         Thought Content: Thought content normal.             Assessment / Plan      Assessment/Plan:   Diagnoses and all orders for this visit:    1. Anxiety (Primary)  2. Psychophysiological insomnia  3. Moderate episode of recurrent major depressive disorder   "      - Encouraged to take part in daily physical exercise.          - Eat healthy, well balanced diet; avoid sugary foods or beverages        - Continue to abstain from alcohol and drugs        - Ensure good night's sleep by creating calm space in bedroom, avoiding screen time 1-2 hours before bed, no caffeine after 5 pm        - Talk to supportive family and friends, as needed     4. Morbid (severe) obesity due to excess calories  -     phentermine 37.5 MG capsule; Take 1 capsule by mouth Every Morning.  Dispense: 30 capsule; Refill: 1       - Class 2 Severe Obesity (BMI >=35 and <=39.9). Obesity-related health conditions include the following: none. Obesity is improving with treatment. BMI is is above average; BMI management plan is completed. We discussed low calorie, low carb based diet program, portion control, and increasing exercise.   5. Encounter for therapeutic drug monitoring  -     ToxAssure Flex 23, Ur -; Future             Follow Up:   Return in about 2 months (around 4/27/2024) for Next scheduled follow up.    Patient was given instructions and counseling regarding her condition or for health maintenance advice. Please see specific information pulled into the AVS if appropriate.       [facility]     Please note that portions of this note may have been completed with a voice recognition program. Efforts were made to edit dictation, but occasionally words are mistranscribed.

## 2024-03-02 LAB
1OH-MIDAZOLAM UR QL SCN: NOT DETECTED NG/MG CREAT
6MAM UR QL SCN: NEGATIVE NG/ML
7AMINOCLONAZEPAM/CREAT UR: NOT DETECTED NG/MG CREAT
A-OH ALPRAZ/CREAT UR: NOT DETECTED NG/MG CREAT
A-OH-TRIAZOLAM/CREAT UR CFM: NOT DETECTED NG/MG CREAT
ACP UR QL CFM: NOT DETECTED
ALPRAZ/CREAT UR CFM: NOT DETECTED NG/MG CREAT
AMPHET UR CFM-MCNC: NOT DETECTED NG/MG CREAT
AMPHETAMINES UR QL SCN: NORMAL NG/ML
AMPHETAMINES UR QL: NEGATIVE
APAP UR QL SCN: NEGATIVE UG/ML
BARBITURATES UR QL SCN: NEGATIVE NG/ML
BENZODIAZ SCN METH UR: NEGATIVE
BUPRENORPHINE UR QL SCN: NEGATIVE
BUPRENORPHINE/CREAT UR: NOT DETECTED NG/MG CREAT
CANNABINOIDS UR QL SCN: NEGATIVE NG/ML
CARISOPRODOL UR QL: NEGATIVE NG/ML
CLONAZEPAM/CREAT UR CFM: NOT DETECTED NG/MG CREAT
COCAINE+BZE UR QL SCN: NEGATIVE NG/ML
CREAT UR-MCNC: 242 MG/DL
D-METHORPHAN UR-MCNC: NOT DETECTED NG/ML
D-METHORPHAN+LEVORPHANOL UR QL: NOT DETECTED
DESALKYLFLURAZ/CREAT UR: NOT DETECTED NG/MG CREAT
DIAZEPAM/CREAT UR: NOT DETECTED NG/MG CREAT
ETHANOL UR QL SCN: NEGATIVE G/DL
ETHANOL UR QL SCN: NEGATIVE NG/ML
FENTANYL CTO UR SCN-MCNC: NEGATIVE NG/ML
FENTANYL/CREAT UR: NOT DETECTED NG/MG CREAT
FLUNITRAZEPAM UR QL SCN: NOT DETECTED NG/MG CREAT
GABAPENTIN UR-MCNC: NEGATIVE UG/ML
HYPNOTICS UR QL SCN: NEGATIVE
KETAMINE UR QL: NOT DETECTED
LORAZEPAM/CREAT UR: NOT DETECTED NG/MG CREAT
MDA UR CFM-MCNC: NOT DETECTED NG/MG CREAT
MDMA UR CFM-MCNC: NOT DETECTED NG/MG CREAT
MEPERIDINE UR QL SCN: NEGATIVE NG/ML
METHADONE UR QL SCN: NEGATIVE NG/ML
METHADONE+METAB UR QL SCN: NEGATIVE NG/ML
METHAMPHET UR CFM-MCNC: NOT DETECTED NG/MG CREAT
MIDAZOLAM/CREAT UR CFM: NOT DETECTED NG/MG CREAT
MISCELLANEOUS, UR: NEGATIVE
NORBUPRENORPHINE/CREAT UR: NOT DETECTED NG/MG CREAT
NORDIAZEPAM/CREAT UR: NOT DETECTED NG/MG CREAT
NORFENTANYL/CREAT UR: NOT DETECTED NG/MG CREAT
NORFLUNITRAZEPAM UR-MCNC: NOT DETECTED NG/MG CREAT
NORKETAMINE UR-MCNC: NOT DETECTED UG/ML
OPIATES UR SCN-MCNC: NEGATIVE NG/ML
OTHER HALLUCINOGENS UR: NEGATIVE
OXAZEPAM/CREAT UR: NOT DETECTED NG/MG CREAT
OXYCODONE CTO UR SCN-MCNC: NEGATIVE NG/ML
PCP UR QL SCN: NEGATIVE NG/ML
PRESCRIBED MEDICATIONS: NORMAL
PROPOXYPH UR QL SCN: NEGATIVE NG/ML
TAPENTADOL CTO UR SCN-MCNC: NEGATIVE NG/ML
TEMAZEPAM/CREAT UR: NOT DETECTED NG/MG CREAT
TRAMADOL UR QL SCN: NEGATIVE NG/ML
ZALEPLON UR-MCNC: NOT DETECTED NG/ML
ZOLPIDEM PHENYL-4-CARB UR QL SCN: NOT DETECTED
ZOLPIDEM UR QL SCN: NOT DETECTED
ZOPICLONE-N-OXIDE UR-MCNC: NOT DETECTED NG/ML

## 2024-10-14 DIAGNOSIS — F51.04 PSYCHOPHYSIOLOGICAL INSOMNIA: ICD-10-CM

## 2024-10-14 DIAGNOSIS — F33.1 MODERATE EPISODE OF RECURRENT MAJOR DEPRESSIVE DISORDER: ICD-10-CM

## 2024-10-14 RX ORDER — BUPROPION HYDROCHLORIDE 300 MG/1
300 TABLET ORAL EVERY MORNING
Qty: 90 TABLET | Refills: 3 | Status: SHIPPED | OUTPATIENT
Start: 2024-10-14

## 2024-10-14 RX ORDER — TRAZODONE HYDROCHLORIDE 50 MG/1
50 TABLET, FILM COATED ORAL NIGHTLY
Qty: 90 TABLET | Refills: 3 | Status: SHIPPED | OUTPATIENT
Start: 2024-10-14

## 2025-01-06 DIAGNOSIS — F41.9 ANXIETY: ICD-10-CM

## 2025-01-06 DIAGNOSIS — F33.1 MODERATE EPISODE OF RECURRENT MAJOR DEPRESSIVE DISORDER: ICD-10-CM

## 2025-01-06 RX ORDER — DULOXETIN HYDROCHLORIDE 60 MG/1
60 CAPSULE, DELAYED RELEASE ORAL 2 TIMES DAILY
Qty: 60 CAPSULE | Refills: 0 | Status: SHIPPED | OUTPATIENT
Start: 2025-01-06

## 2025-01-06 NOTE — TELEPHONE ENCOUNTER
Rx Refill Note  Requested Prescriptions     Pending Prescriptions Disp Refills    DULoxetine (CYMBALTA) 60 MG capsule [Pharmacy Med Name: DULoxetine HCL DR 60 MG CAPSULE] 60 capsule      Sig: TAKE 1 CAPSULE BY MOUTH TWICE A DAY      Last office visit with prescribing clinician: 2/27/2024   Last telemedicine visit with prescribing clinician: Visit date not found   Next office visit with prescribing clinician: Visit date not found       Shanon Mauricio MA  01/06/25, 10:16 EST

## 2025-02-09 DIAGNOSIS — F41.9 ANXIETY: ICD-10-CM

## 2025-02-09 DIAGNOSIS — F33.1 MODERATE EPISODE OF RECURRENT MAJOR DEPRESSIVE DISORDER: ICD-10-CM

## 2025-02-10 RX ORDER — DULOXETIN HYDROCHLORIDE 60 MG/1
60 CAPSULE, DELAYED RELEASE ORAL 2 TIMES DAILY
Qty: 60 CAPSULE | Refills: 0 | Status: SHIPPED | OUTPATIENT
Start: 2025-02-10

## 2025-02-21 ENCOUNTER — E-VISIT (OUTPATIENT)
Dept: FAMILY MEDICINE CLINIC | Facility: TELEHEALTH | Age: 25
End: 2025-02-21
Payer: COMMERCIAL

## 2025-02-21 PROCEDURE — FABRICHEALTHVISIT: Performed by: NURSE PRACTITIONER

## 2025-02-21 NOTE — E-VISIT TREATED
Date: 2025 09:54:34  Clinician: Shabnam Oconnell  Clinician NPI: [id number]  Patient: Oliva [facility]  Patient : 2000  Patient Address: [address] [place] [address]  Patient Phone: [phone]  Visit Protocol: URI  Patient Summary:  Oliva is a 25 year old ( : 2000 ) female who initiated a visit for cold, sinus infection, or influenza.     Oliva states the symptoms started 1-2 days ago.   Symptom start date: 2025   The symptoms consist of rhinitis,   malaise, a sore throat, nasal congestion, and chills. Oliva is experiencing difficulty breathing due to nasal congestion but is not short of breath.   Symptom details     Nasal secretions: The color of the mucus is clear.    Sore throat: Oliva reports having mild throat pain (1-3 on a 10 point pain scale), does not have exudate on the tonsils, and can swallow liquids with mild discomfort. Oliva is not sure if the lymph nodes in the neck are enlarged. A rash has not appeared   on the skin since the sore throat started.      Oliva denies having headache, wheezing, ear pain, vomiting, facial pain or pressure, myalgias, fever, anosmia and ageusia, teeth pain, nausea, diarrhea, and cough. Oliva also denies having recent facial or sinus surgery in the past 60 days, pain in the   front of the neck that sometimes moves to the ear, experiencing difficulty opening their mouth due to pain or swelling in the jaw muscles, and taking antibiotic medication in the past month.   Precipitating events  Oliva is not sure if they have been   exposed to someone with strep throat. Oliva has recently been exposed to someone with influenza. Oliva has not been in close contact with any high risk individuals.    Oliva has not received the influenza vaccination.   Pertinent COVID-19 (Coronavirus)   information  Since the symptoms started, Oliva has not tested for COVID-19. Oliva was not able to re-test today.   Oliva does not need a COVID-19 test.   Oliva  has not received a COVID-19 vaccine in the past year.   Pertinent medical history    Oliva   reports having the following conditions:   Mental health conditions   Oliva has asthma. Oliva uses quick-relief inhaler less than two times per week and wakes up at night with asthma symptoms less than two times per month. Oliva refills the quick-relief   inhaler less than two times per year.   Oliva typically gets a yeast infection when an antibiotic is taken. Oliva has successfully used non-prescription therapy to treat previous yeast infections.   A provider has not told Oliva to avoid NSAIDs.   Oliva   does not have diabetes. Oliva denies having immunosuppressive conditions (e.g., chemotherapy, HIV, organ transplant, long-term use of steroids or other immunosuppressive medications, splenectomy).   Oliva does not smoke or use smokeless tobacco. Oliva   does not vape or use other e-cigarette products.   Oliva denies pregnancy and denies breastfeeding.   Additional information as reported by the patient (free text): I know I for sure have something viral because my fiance had a cold earlier in the week.   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   Weight: 211 lbs (95.71 kg)    MEDICATIONS: duloxetine oral, loratadine oral, levonorgestrel intrauterine, trazodone oral, albuterol sulfate inhalation, omeprazole oral, bupropion HCl oral, ALLERGIES: clindamycin  Clinician Response:  Dear Oliva,  Based on the information provided, you have an influenza-like illness. This is an infection that has the same symptoms of the flu, but the specific virus is not known. Lab testing would be required to confirm the flu   virus, but this is often not necessary because the treatment will be the same no matter what is causing your symptoms.  Your symptoms should improve gradually over the next week.  Medication information  I am prescribing:     Oseltamivir (Tamiflu) 75  mg oral capsule. Take 1 capsule by mouth 2 times per day for 5 days. There are no refills with this prescription.   Self care  Steps you can take to be as comfortable as possible:     Rest.    Drink plenty of fluids.    Take a warm shower to loosen congestion.    Use a cool-mist humidifier.    Use throat lozenges.    Suck on frozen items such as popsicles.    Drink hot tea with lemon and honey.    Gargle with warm salt water (1/4 teaspoon of salt per 8 ounce glass of water).     If you have a fever, stay home until your temperature has returned to normal for 24 hours and you feel well enough for daily activities. And of course, wash your hands often to prevent spreading the flu and other illnesses. However, the best way to   prevent the flu is to get a flu shot before each flu season.  When to seek care  Please be seen in a clinic or urgent care if any of the following occur:   New symptoms develop, or symptoms become worse   Call 911 or go to the emergency room if any of   the following occur:     Difficulty breathing    If you feel that your throat is closing off    Suddenly develop a rash    Unable to swallow fluids or are drooling     For the latest updates on COVID-19 (Coronavirus), please visit the Centers for Disease Control and Prevention (CDC). Also, your state and local health department websites may provide additional guidance regarding testing and isolation recommendations   for your location.   Diagnosis: Influenza-like illness  Diagnosis ICD: J11.1    Follow up instructions: ATTENTION: If you have been prescribed medications, your prescriptions will not be sent until you choose your pharmacy.  To do so open the link within your notification, or go to [facility] and click eVisit in the menu to open your   treatment plan. From there, you can select your pharmacy at the bottom of your after visit summary. You can also go to [email or web address]  Prescriptions  Prescription:  oseltamivir (Tamiflu) 75 mg oral capsule, take 1 capsule by mouth 2 times per day for 5 days  Sent To: [facility] [id number] - [phone] - [address] [place] [address]

## 2025-02-25 ENCOUNTER — PATIENT ROUNDING (BHMG ONLY) (OUTPATIENT)
Dept: URGENT CARE | Facility: CLINIC | Age: 25
End: 2025-02-25
Payer: COMMERCIAL

## 2025-03-10 DIAGNOSIS — F33.1 MODERATE EPISODE OF RECURRENT MAJOR DEPRESSIVE DISORDER: ICD-10-CM

## 2025-03-10 DIAGNOSIS — F41.9 ANXIETY: ICD-10-CM

## 2025-03-10 RX ORDER — DULOXETIN HYDROCHLORIDE 60 MG/1
60 CAPSULE, DELAYED RELEASE ORAL 2 TIMES DAILY
Qty: 60 CAPSULE | Refills: 0 | Status: SHIPPED | OUTPATIENT
Start: 2025-03-10

## 2025-05-05 DIAGNOSIS — F41.9 ANXIETY: ICD-10-CM

## 2025-05-05 DIAGNOSIS — F33.1 MODERATE EPISODE OF RECURRENT MAJOR DEPRESSIVE DISORDER: ICD-10-CM

## 2025-05-05 RX ORDER — DULOXETIN HYDROCHLORIDE 60 MG/1
60 CAPSULE, DELAYED RELEASE ORAL EVERY 12 HOURS SCHEDULED
Qty: 60 CAPSULE | Refills: 0 | OUTPATIENT
Start: 2025-05-05

## 2025-06-02 DIAGNOSIS — F33.1 MODERATE EPISODE OF RECURRENT MAJOR DEPRESSIVE DISORDER: ICD-10-CM

## 2025-06-02 DIAGNOSIS — F41.9 ANXIETY: ICD-10-CM

## 2025-06-03 RX ORDER — DULOXETIN HYDROCHLORIDE 60 MG/1
60 CAPSULE, DELAYED RELEASE ORAL EVERY 12 HOURS SCHEDULED
Qty: 60 CAPSULE | Refills: 0 | Status: SHIPPED | OUTPATIENT
Start: 2025-06-03

## 2025-06-29 DIAGNOSIS — F41.9 ANXIETY: ICD-10-CM

## 2025-06-29 DIAGNOSIS — F33.1 MODERATE EPISODE OF RECURRENT MAJOR DEPRESSIVE DISORDER: ICD-10-CM

## 2025-06-30 RX ORDER — DULOXETIN HYDROCHLORIDE 60 MG/1
60 CAPSULE, DELAYED RELEASE ORAL EVERY 12 HOURS SCHEDULED
Qty: 60 CAPSULE | Refills: 0 | Status: SHIPPED | OUTPATIENT
Start: 2025-06-30